# Patient Record
Sex: FEMALE | Race: WHITE | URBAN - METROPOLITAN AREA
[De-identification: names, ages, dates, MRNs, and addresses within clinical notes are randomized per-mention and may not be internally consistent; named-entity substitution may affect disease eponyms.]

---

## 2022-08-02 ENCOUNTER — OFFICE VISIT (OUTPATIENT)
Dept: INTERNAL MEDICINE CLINIC | Facility: CLINIC | Age: 52
End: 2022-08-02

## 2022-08-02 VITALS
WEIGHT: 186.81 LBS | HEART RATE: 99 BPM | BODY MASS INDEX: 35.27 KG/M2 | RESPIRATION RATE: 16 BRPM | DIASTOLIC BLOOD PRESSURE: 80 MMHG | SYSTOLIC BLOOD PRESSURE: 130 MMHG | OXYGEN SATURATION: 98 % | TEMPERATURE: 98 F | HEIGHT: 61.02 IN

## 2022-08-02 DIAGNOSIS — R10.9 CHRONIC ABDOMINAL PAIN: Primary | ICD-10-CM

## 2022-08-02 DIAGNOSIS — G89.29 CHRONIC ABDOMINAL PAIN: Primary | ICD-10-CM

## 2022-08-02 DIAGNOSIS — Z12.11 COLON CANCER SCREENING: ICD-10-CM

## 2022-08-02 PROCEDURE — 3008F BODY MASS INDEX DOCD: CPT | Performed by: INTERNAL MEDICINE

## 2022-08-02 PROCEDURE — 3075F SYST BP GE 130 - 139MM HG: CPT | Performed by: INTERNAL MEDICINE

## 2022-08-02 PROCEDURE — 3079F DIAST BP 80-89 MM HG: CPT | Performed by: INTERNAL MEDICINE

## 2022-08-02 PROCEDURE — 99203 OFFICE O/P NEW LOW 30 MIN: CPT | Performed by: INTERNAL MEDICINE

## 2022-08-02 RX ORDER — IBUPROFEN 800 MG/1
800 TABLET ORAL EVERY 8 HOURS PRN
COMMUNITY
Start: 2022-04-06

## 2022-08-05 DIAGNOSIS — R91.1 PULMONARY NODULE: Primary | ICD-10-CM

## 2022-08-09 ENCOUNTER — TELEPHONE (OUTPATIENT)
Dept: INTERNAL MEDICINE CLINIC | Facility: CLINIC | Age: 52
End: 2022-08-09

## 2022-08-09 NOTE — TELEPHONE ENCOUNTER
Pt dropped off lab test results from [address] [place] [address], results placed at Dr. Chloe Bautista folder to review.

## 2022-08-10 ENCOUNTER — TELEPHONE (OUTPATIENT)
Dept: INTERNAL MEDICINE CLINIC | Facility: CLINIC | Age: 52
End: 2022-08-10

## 2022-08-10 NOTE — TELEPHONE ENCOUNTER
Received labs from 8/8/22  Please let pt know:  TSH/free t4, T3 wnl   Lipid panel shows elevated triglycerides; recommend diet modification including reducing fats/carbohydrates/sugar intake and increasing exercise as tolerated. Not anemic and UA negative for infection. Renal/liver labs wnl. Results to be entered and sent to scan.

## 2022-08-12 ENCOUNTER — TELEPHONE (OUTPATIENT)
Dept: INTERNAL MEDICINE CLINIC | Facility: CLINIC | Age: 52
End: 2022-08-12

## 2022-08-12 DIAGNOSIS — R91.8 PULMONARY NODULES: Primary | ICD-10-CM

## 2022-08-12 DIAGNOSIS — Z87.891 HISTORY OF SMOKING: ICD-10-CM

## 2022-08-12 LAB
ALKALINE PHOSPHATASE: 68 U/L
ALT: 18 U/L
AST: 13 U/L
BILIRUBIN, TOTAL: 0.4 MG/DL
CALCIUM: 9.1 MG/DL
CHLORIDE: 105 MMOL/L
CHOLESTEROL, TOTAL: 176 MG/DL
CREATININE: 0.64 MG/DL (ref 0.6–1.2)
FREE T4 INDEX (T7): 2
HCT: 39.1 %
HDL CHOLESTEROL: 35 MG/DL
HGB: 13.3 G/DL
LDL CHOLESTEROL: 96 MG/DL (ref ?–130)
PLATELET COUNT: 217 THOUSAND/UL
POTASSIUM: 4.2 MMOL/L
SODIUM: 139 MMOL/L
T3 UPTAKE: 28 %
T4 (THYROXINE), TOTAL: 7.2 MCG/DL
TRIGLYCERIDES: 334 MG/DL
TSH: 2.21 MIU/L
WBC: 5.8 THOUSAND/UL

## 2022-09-28 ENCOUNTER — OFFICE VISIT (OUTPATIENT)
Dept: GASTROENTEROLOGY | Facility: CLINIC | Age: 52
End: 2022-09-28

## 2022-09-28 VITALS
SYSTOLIC BLOOD PRESSURE: 130 MMHG | DIASTOLIC BLOOD PRESSURE: 80 MMHG | BODY MASS INDEX: 33.99 KG/M2 | WEIGHT: 180 LBS | HEIGHT: 61 IN

## 2022-09-28 DIAGNOSIS — K21.9 GASTROESOPHAGEAL REFLUX DISEASE, UNSPECIFIED WHETHER ESOPHAGITIS PRESENT: Primary | ICD-10-CM

## 2022-09-28 DIAGNOSIS — Z12.11 COLON CANCER SCREENING: ICD-10-CM

## 2022-09-28 PROCEDURE — S0285 CNSLT BEFORE SCREEN COLONOSC: HCPCS | Performed by: STUDENT IN AN ORGANIZED HEALTH CARE EDUCATION/TRAINING PROGRAM

## 2022-09-28 RX ORDER — SODIUM, POTASSIUM,MAG SULFATES 17.5-3.13G
SOLUTION, RECONSTITUTED, ORAL ORAL
Qty: 1 EACH | Refills: 0 | Status: SHIPPED | OUTPATIENT
Start: 2022-09-28

## 2023-03-02 ENCOUNTER — OFFICE VISIT (OUTPATIENT)
Facility: CLINIC | Age: 53
End: 2023-03-02

## 2023-03-02 VITALS
HEART RATE: 104 BPM | SYSTOLIC BLOOD PRESSURE: 116 MMHG | BODY MASS INDEX: 35.05 KG/M2 | WEIGHT: 185.63 LBS | DIASTOLIC BLOOD PRESSURE: 78 MMHG | HEIGHT: 61 IN

## 2023-03-02 DIAGNOSIS — Z12.31 ENCOUNTER FOR MAMMOGRAM TO ESTABLISH BASELINE MAMMOGRAM: Primary | ICD-10-CM

## 2023-03-02 DIAGNOSIS — Z12.4 PAPANICOLAOU SMEAR FOR CERVICAL CANCER SCREENING: ICD-10-CM

## 2023-03-02 PROCEDURE — 99386 PREV VISIT NEW AGE 40-64: CPT | Performed by: OBSTETRICS & GYNECOLOGY

## 2023-03-02 RX ORDER — ESTRADIOL 0.1 MG/G
1 CREAM VAGINAL
Qty: 1 G | Refills: 5 | Status: SHIPPED | OUTPATIENT
Start: 2023-03-02

## 2023-03-20 LAB — HPV I/H RISK 1 DNA SPEC QL NAA+PROBE: NEGATIVE

## 2023-03-21 LAB — LAST PAP RESULT: NORMAL

## 2024-12-17 ENCOUNTER — OFFICE VISIT (OUTPATIENT)
Dept: INTERNAL MEDICINE CLINIC | Facility: CLINIC | Age: 54
End: 2024-12-17

## 2024-12-17 VITALS
WEIGHT: 173.19 LBS | HEART RATE: 96 BPM | SYSTOLIC BLOOD PRESSURE: 136 MMHG | OXYGEN SATURATION: 100 % | HEIGHT: 61.2 IN | TEMPERATURE: 98 F | BODY MASS INDEX: 32.7 KG/M2 | DIASTOLIC BLOOD PRESSURE: 78 MMHG

## 2024-12-17 DIAGNOSIS — Z12.11 COLON CANCER SCREENING: ICD-10-CM

## 2024-12-17 DIAGNOSIS — R42 VERTIGO: ICD-10-CM

## 2024-12-17 DIAGNOSIS — M54.9 BACK PAIN WITH SCIATICA: Primary | ICD-10-CM

## 2024-12-17 DIAGNOSIS — M54.30 BACK PAIN WITH SCIATICA: Primary | ICD-10-CM

## 2024-12-17 DIAGNOSIS — R91.8 PULMONARY NODULES: ICD-10-CM

## 2024-12-17 PROCEDURE — 99214 OFFICE O/P EST MOD 30 MIN: CPT | Performed by: INTERNAL MEDICINE

## 2024-12-17 RX ORDER — MECLIZINE HYDROCHLORIDE 25 MG/1
25 TABLET ORAL EVERY 8 HOURS PRN
Qty: 30 TABLET | Refills: 0 | Status: SHIPPED | OUTPATIENT
Start: 2024-12-17

## 2024-12-17 RX ORDER — METHYLPREDNISOLONE 4 MG/1
TABLET ORAL
Qty: 1 EACH | Refills: 0 | Status: SHIPPED | OUTPATIENT
Start: 2024-12-17

## 2024-12-17 RX ORDER — MELOXICAM 15 MG/1
15 TABLET ORAL DAILY PRN
Qty: 30 TABLET | Refills: 0 | Status: SHIPPED | OUTPATIENT
Start: 2024-12-17

## 2024-12-17 NOTE — PROGRESS NOTES
Subjective:   Phyllis Alexis is a 54 year old female  who presents for Pain (Lower Back )     Reports going to chiropractor regularly due to back pain.   States that lately its not helping her .  Now with RLE sciatica that is constant unless laying down. Ibuprofen helps but comes back  Denies saddle anesthesia/bowel or bladder incontinence/weakness     Pulmonary nodules noted in 8/2022. Pt was advised to follow-up with CT but has not followed up since.   CT ordered-provided [facility] order      Vertigo that lasted for a few days. Resolved now.   No acute cp/sob/angina   Hx of vertigo in remote past.     History/Other:    Chief Complaint Reviewed and Verified  Nursing Notes Reviewed and   Verified  Tobacco Reviewed  Allergies Reviewed  Medications Reviewed    Problem List Reviewed  Medical History Reviewed  Surgical History   Reviewed  Family History Reviewed  Social History Reviewed         Current Outpatient Medications   Medication Sig Dispense Refill    methylPREDNISolone (MEDROL) 4 MG Oral Tablet Therapy Pack As directed. 1 each 0    Meloxicam 15 MG Oral Tab Take 1 tablet (15 mg total) by mouth daily as needed for Pain. Do NOT take with other NSAIDs such as Aleve, Motrin, Ibuprofen or Advil 30 tablet 0    meclizine 25 MG Oral Tab Take 1 tablet (25 mg total) by mouth every 8 (eight) hours as needed for Dizziness. 30 tablet 0    ibuprofen 800 MG Oral Tab Take 1 tablet (800 mg total) by mouth every 8 (eight) hours as needed.         Review of Systems:  Pertinent items are noted in HPI.  A comprehensive 10 point review of systems was completed.  Pertinent positives and negatives noted in the the HPI.        Objective:   /78   Pulse 96   Temp 98.2 °F (36.8 °C)   Ht 5' 1.2\" (1.554 m)   Wt 173 lb 3.2 oz (78.6 kg)   SpO2 100%   BMI 32.51 kg/m²  Estimated body mass index is 32.51 kg/m² as calculated from the following:    Height as of this encounter: 5' 1.2\" (1.554 m).    Weight as of this encounter:  173 lb 3.2 oz (78.6 kg).  PHYSICAL EXAM:   General: no acute distress   Respiratory: no increased work of breathing; good air exchange; CTAB; no crackles or wheezing   Cardiovascular: RRR; S1, S2; no murmurs  Neurological: awake, alert, oriented x3; CNII-XII grossly intact;  MSK: RLE radiculopathy strength 5/5  Behavioral/Psych: euthymic; appropriate affect       Assessment & Plan:   Phyllis was seen today for pain.    Diagnoses and all orders for this visit:    Back pain with sciatica  -     methylPREDNISolone (MEDROL) 4 MG Oral Tablet Therapy Pack; As directed.  -     z [facility] XR LUMBAR SPINE COMPLETE W/FLEX + EXT (CPT=72114); Future  -     Physical Therapy Referral - [facility]  -     Meloxicam 15 MG Oral Tab; Take 1 tablet (15 mg total) by mouth daily as needed for Pain. Do NOT take with other NSAIDs such as Aleve, Motrin, Ibuprofen or Advil    Pulmonary nodules  -     z [facility] CT CHEST (CPT=71250);     Colon cancer screening  -     INSURE FECAL GLOBIN by IMMUNOASSAY [[id number]] [Q]    Vertigo  -     meclizine 25 MG Oral Tab; Take 1 tablet (25 mg total) by mouth every 8 (eight) hours as needed for Dizziness.      Also discussed free mammogram services that are provided in the state              Reny Lind MD

## 2025-01-06 ENCOUNTER — TELEPHONE (OUTPATIENT)
Dept: INTERNAL MEDICINE CLINIC | Facility: CLINIC | Age: 55
End: 2025-01-06

## 2025-01-06 NOTE — TELEPHONE ENCOUNTER
DVF: while otp with pt going over lab results, pt asked if she could take 800 mg ibuprofen rather than Meloxicam. She feels it would be easier on her stomach.   Pt understands she can't take them together.     Are you willing to send in Rx for 800 mg ibuprofen, or should pt just stick with Meloxicam? If so, please send to [facility]    **pt OK with MCM response**

## 2025-01-06 NOTE — TELEPHONE ENCOUNTER
New GI symptoms of diarrhea/cramping not side effects of medication. Likely infectious. If symptoms not improving then should make appt

## 2025-01-06 NOTE — TELEPHONE ENCOUNTER
Dr. Oliver PICKETT.   Patient reports feeling cramping abdominal pain and has diarrhea. Patient will only take tylenol for pain. Patient will go to PT for back pain. This RN sent [employer] message with PT contact information.

## 2025-01-06 NOTE — TELEPHONE ENCOUNTER
Ibuprofen might actually be worse than meloxicam regarding stomach symptoms.   Is she having abdominal pain? If she is then should stop and only do Tylenol for pain.   Should also try PT for back pain.

## 2025-01-07 ENCOUNTER — TELEMEDICINE (OUTPATIENT)
Dept: INTERNAL MEDICINE CLINIC | Facility: CLINIC | Age: 55
End: 2025-01-07

## 2025-01-07 DIAGNOSIS — K52.9 GASTROENTERITIS: Primary | ICD-10-CM

## 2025-01-07 DIAGNOSIS — R51.9 NONINTRACTABLE EPISODIC HEADACHE, UNSPECIFIED HEADACHE TYPE: ICD-10-CM

## 2025-01-07 PROCEDURE — 98004 SYNCH AUDIO-VIDEO EST SF 10: CPT | Performed by: INTERNAL MEDICINE

## 2025-01-07 NOTE — TELEPHONE ENCOUNTER
Action Requested: Summary for Provider     []  Critical Lab, Recommendations Needed  [x] Need Additional Advice  []   FYI    []   Need Orders  [] Need Medications Sent to Pharmacy  []  Other     SUMMARY:  Spoke to patient who reports having diarrhea, severe headache, low grade fever 99.0 F, strong urine odor, and abdominal cramping. Patient states these symptoms began on 1/6 and claims her  and son previously had similar symptoms and believes it could be the norovirus. Patient denies shortness of breath, chest pain, urination pain, nausea, vomiting, and weakness. Patient has not taken any over the counter medication. Patient describes having a throbbing headache in the front and back of her head. Patient states she doesn't have much appetite, but she is staying hydrated. Patient is wanting PCP recommendations.    This RN recommended that the patient stay hydrated, get plenty of rest, and eat bland, easily digestible foods. Advised patient is she has worsening symptoms she should go to an immediate care. Patient verbalized understanding.    Dr. Heath- Any recommendations? Please advise. TY     Reason for call: Sick Call (Stomach flu, Diarrhea, Headache, Urination odor )  Onset: Jan 6, 2025                    *Ok to send patient [employer] message with PCP recommendations.

## 2025-01-07 NOTE — PROGRESS NOTES
Subjective:   Phyllis Alexis is a 54 year old female who presents for the following:       Reports 2 days of diarrhea/HA and fevers. Today no longer has fevers and diarrhea has significantly slowed down. No BM today yet.   Denies rectal bleeding.   Fever has resolved.   Has had sick family members.   Denies nausea/vomiting   Able to hydrate.     History/Other:    No Further Nursing Notes to Review         Current Outpatient Medications   Medication Sig Dispense Refill    methylPREDNISolone (MEDROL) 4 MG Oral Tablet Therapy Pack As directed. 1 each 0    Meloxicam 15 MG Oral Tab Take 1 tablet (15 mg total) by mouth daily as needed for Pain. Do NOT take with other NSAIDs such as Aleve, Motrin, Ibuprofen or Advil 30 tablet 0    meclizine 25 MG Oral Tab Take 1 tablet (25 mg total) by mouth every 8 (eight) hours as needed for Dizziness. 30 tablet 0       Review of Systems:  Pertinent items are noted in HPI.  A comprehensive 10 point review of systems was completed.  Pertinent positives and negatives noted in the the HPI.        Objective:   There were no vitals taken for this visit. Estimated body mass index is 32.51 kg/m² as calculated from the following:    Height as of 12/17/24: 5' 1.2\" (1.554 m).    Weight as of 12/17/24: 173 lb 3.2 oz (78.6 kg).  PHYSICAL EXAM:   General:  no acute distress   Respiratory: no increased work of breathing;   Neurological: awake, alert, oriented x3; CNII-XII grossly intact;  Behavioral/Psych: euthymic; appropriate affect   Skin: no facial rashes     Assessment & Plan:   Diagnoses and all orders for this visit:    Gastroenteritis  Nonintractable episodic headache, unspecified headache type  -as above. Likely viral vs other   -improving.   -discussed Pedialyte/hydration and bland diet   -supportive care   -close monitoring   -covid test               This visit is conducted using Telemedicine with live, interactive video and audio.    Patient has been referred to the [facility] website at  [email or web address] to review the yearly Consent to Treat document.    Patient understands and accepts financial responsibility for any deductible, co-insurance and/or co-pays associated with this service.    Reny Lind MD

## 2025-01-13 DIAGNOSIS — M54.30 BACK PAIN WITH SCIATICA: ICD-10-CM

## 2025-01-13 DIAGNOSIS — M54.9 BACK PAIN WITH SCIATICA: ICD-10-CM

## 2025-01-13 RX ORDER — MELOXICAM 15 MG/1
15 TABLET ORAL DAILY PRN
Qty: 30 TABLET | Refills: 0 | Status: SHIPPED | OUTPATIENT
Start: 2025-01-13

## 2025-01-13 NOTE — TELEPHONE ENCOUNTER
Requesting    Name from pharmacy: MELOXICAM 15 MG TABLET         Will file in chart as: MELOXICAM 15 MG Oral Tab    Sig: Take 1 tablet (15 mg total) by mouth daily as needed for Pain. Do NOT take with other NSAIDs such as Aleve, Motrin, Ibuprofen or Advil    Disp: 30 tablet    Refills: 0 (Pharmacy requested: Not specified)    Start: 1/13/2025    Class: Normal    Non-formulary For: Back pain with sciatica    Last ordered: 3 weeks ago (12/17/2024) by Reny Lind MD    Last refill: 12/17/2024    Rx #: [id number]    Non-Narcotic Pain Medication Protocol Passed 01/13/2025 12:25 AM   Protocol Details In person appointment or virtual visit in the past 6 mos or appointment in next 3 mos    Medication is active on med list      To be filled at: [facility] [address] [address] [phone], [phone]     LOV: 01/07/25 w/ DVF   RTC: No Follow Up on File    Last Relevant Labs: No Recent Labs on File     Future Appointments   Date Time Provider Department Center   2/5/2025  2:00 PM Reny Velasquez MD [facility]

## 2025-01-15 ENCOUNTER — OFFICE VISIT (OUTPATIENT)
Dept: INTERNAL MEDICINE CLINIC | Facility: CLINIC | Age: 55
End: 2025-01-15

## 2025-01-15 VITALS
RESPIRATION RATE: 16 BRPM | BODY MASS INDEX: 32.47 KG/M2 | DIASTOLIC BLOOD PRESSURE: 72 MMHG | HEIGHT: 61.2 IN | WEIGHT: 172 LBS | HEART RATE: 58 BPM | TEMPERATURE: 98 F | SYSTOLIC BLOOD PRESSURE: 120 MMHG | OXYGEN SATURATION: 98 %

## 2025-01-15 DIAGNOSIS — R30.0 DYSURIA: Primary | ICD-10-CM

## 2025-01-15 LAB
APPEARANCE: CLEAR
BILIRUBIN: NEGATIVE
GLUCOSE (URINE DIPSTICK): NEGATIVE MG/DL
KETONES (URINE DIPSTICK): NEGATIVE MG/DL
MULTISTIX EXPIRATION DATE: 2025 DATE
MULTISTIX LOT#: 4684 NUMERIC
NITRITE, URINE: NEGATIVE
PH, URINE: 6 (ref 4.5–8)
PROTEIN (URINE DIPSTICK): NEGATIVE MG/DL
SPECIFIC GRAVITY: 1.02 (ref 1–1.03)
URINE-COLOR: YELLOW
UROBILINOGEN,SEMI-QN: 0.2 MG/DL (ref 0–1.9)

## 2025-01-15 PROCEDURE — 81003 URINALYSIS AUTO W/O SCOPE: CPT | Performed by: INTERNAL MEDICINE

## 2025-01-15 PROCEDURE — 87086 URINE CULTURE/COLONY COUNT: CPT | Performed by: INTERNAL MEDICINE

## 2025-01-15 PROCEDURE — 99213 OFFICE O/P EST LOW 20 MIN: CPT | Performed by: INTERNAL MEDICINE

## 2025-01-15 NOTE — PATIENT INSTRUCTIONS
We will contact you with your final urine test results.  Let me know sooner if your symptoms return

## 2025-01-15 NOTE — PROGRESS NOTES
Patient Office Visit    ASSESSMENT AND PLAN:   1. Dysuria  Note: Will await final urine culture results before starting antibiotics that she is doing better  - Urine Dip, auto without Micro  - Urine Culture, Routine [E]      Return to clinic as needed for above    Patient/Caregiver Education: Patient/Caregiver Education: There are no barriers to learning. Medical education done. Outcome: Patient verbalizes understanding. Patient is notified to call with any questions, complications, allergies, or worsening or changing symptoms.  Patient is to call with any side effects or complications from the treatments as a result of today.      Reviewed Past Medical History and   There is no problem list on file for this patient.      Orders Placed This Encounter   Procedures    Urine Dip, auto without Micro     Order Specific Question:   Release to patient     Answer:   Immediate     Requested Prescriptions      No prescriptions requested or ordered in this encounter         Melia Theodore DO  CC:  Chief Complaint   Patient presents with    UTI         HPI:   Phyllis Alexis is a 54-year-old female who presents for dysuria    Dysuria: Has been going on for the past few days.  It started after she had some diarrhea and was not feeling well.  She started to have back pain and burning with urination and urinary frequency.  Yesterday she has noticed that her symptoms were better and today she is barely having any symptoms.  She still just wanted to make sure she does not have a UTI.  Last UTI was almost 20 years ago.    Past Medical History:    Anxiety    Hyperlipidemia       Past Surgical History:   Procedure Laterality Date    Appendectomy      Appendectomy            Tubal ligation         Social History:  Social History     Socioeconomic History    Marital status:    Tobacco Use    Smoking status: Former     Current packs/day: 0.00     Types: Cigarettes     Quit date: 2020     Years since quitting:  4.1    Smokeless tobacco: Former    Tobacco comments:     I used to smoke one or two cigarettes a day but I used to quit for long periods of time   Vaping Use    Vaping status: Never Used   Substance and Sexual Activity    Alcohol use: Yes     Alcohol/week: 1.0 standard drink of alcohol     Types: 1 Glasses of wine per week     Comment: not once a week more like once a month    Drug use: Never    Sexual activity: Not Currently   Other Topics Concern    Caffeine Concern No    Exercise No    Seat Belt Yes     Comment: I use it all the time    Special Diet No    Stress Concern Yes     Comment: I have a son with autism and that causes a lot of stress    Weight Concern Yes     Comment: yes its difficult to lose weight more after menopause     Family History:  Family History   Problem Relation Age of Onset    Other (Other) Father 81        Covid Complications    Diabetes Father     Diabetes Maternal Grandmother     Asthma Brother     Diabetes Brother     Psychiatric Son         Autism     Allergies:  Allergies[1]  Current Meds:  Medications Ordered Prior to Encounter[2]      REVIEW OF SYSTEMS   Constitutional: no fatigue normal energy no weight changes   HENT: normal sinuses and no mouth issues   Eyes: . normal vision no eye pain   Respiratory: normal respirations no cough   Cardiovascular: no CP, or palpitations   Gastrointestinal: normal bowels and no abd pains   Genitourinary: As above  Musculoskeletal: no pains in arms/legs, normal range of motion   Skin: no rashes or skin lesions that are new   Neurological:  no weakness, no numbness, normal gait   Hematological:  no bruises or bleeding   Psychiatric/Behavioral: normal mood no anxiety normal behavior     /72 (BP Location: Left arm, Patient Position: Sitting, Cuff Size: adult)   Pulse 58   Temp 98 °F (36.7 °C) (Temporal)   Resp 16   Ht 5' 1.2\" (1.554 m)   Wt 172 lb (78 kg)   SpO2 98%   BMI 32.29 kg/m²     PHYSICAL EXAM:   Constitutional: Vital signs  reviewed as noted, well developed, in no acute distress.   HENT: NCAT  Eyes: pupils reactive bilaterally  Cardiovascular: nl s1 s2 no m/r/g  Pulmonary/Chest: CTA bilaterally with no wheezes  Abdominal: Soft NT normal Bowel sounds, no CVA tenderness noted  Extremities: no pedal edema   Neurological:  no weakness in UE and LE, reflexes are normal  Skin: no rashes or bruises on visualized skin, not undressed   Psychiatric:normal mood              [1]   Allergies  Allergen Reactions    Dust SHORTNESS OF BREATH, OTHER (SEE COMMENTS) and Coughing    Dust Mite Extract UNKNOWN     Fall allergies   [2]   Current Outpatient Medications on File Prior to Visit   Medication Sig Dispense Refill    MELOXICAM 15 MG Oral Tab TAKE 1 TABLET (15 MG TOTAL) BY MOUTH DAILY AS NEEDED FOR PAIN. DO NOT TAKE WITH OTHER NSAIDS SUCH AS ALEVE, MOTRIN, IBUPROFEN OR ADVIL 30 tablet 0    methylPREDNISolone (MEDROL) 4 MG Oral Tablet Therapy Pack As directed. (Patient not taking: Reported on 1/15/2025) 1 each 0    meclizine 25 MG Oral Tab Take 1 tablet (25 mg total) by mouth every 8 (eight) hours as needed for Dizziness. 30 tablet 0     No current facility-administered medications on file prior to visit.

## 2025-02-04 ENCOUNTER — SPINE CENTER NAVIGATION (OUTPATIENT)
Age: 55
End: 2025-02-04

## 2025-02-04 ENCOUNTER — TELEPHONE (OUTPATIENT)
Dept: INTERNAL MEDICINE CLINIC | Facility: CLINIC | Age: 55
End: 2025-02-04

## 2025-02-04 DIAGNOSIS — M54.40 LOW BACK PAIN WITH SCIATICA, SCIATICA LATERALITY UNSPECIFIED, UNSPECIFIED BACK PAIN LATERALITY, UNSPECIFIED CHRONICITY: Primary | ICD-10-CM

## 2025-02-04 NOTE — TELEPHONE ENCOUNTER
We received a form for Dr. Ivana Lind to sign for PT Update placed in DVF's inbasket for review and signature.

## 2025-02-04 NOTE — PROGRESS NOTES
Spine Center Referral Navigation Encounter Note    Referred by: Reny Lind MD     Imaging: XR Lumbar Spine, MRI Lumbar Spine ordered   If imaging done at an external facility, instructed patient to bring disc of MRI to appointment.     Previously Seen Spine Care Providers: None    Referred to: Ana Cristina Hector PA-C in Neurosurgery     Information below is patient reported.     [facility] [employer]       2/4- Spoke to patient. She would like to have her MRI done prior to being seen. Advised her to schedule her MRI with the scheduling department and then contact me and we can schedule her an appointment after she would have that imaging done.    2/12- Spoke to patient and was able to get her scheduled for next Tuesday 2/18 per her request.

## 2025-02-04 NOTE — TELEPHONE ENCOUNTER
Signed. Placed in out bin.  Please also let her know that since no improvement with PT then should see spine specialist and do MRI ([facility] order placed). Orders placed.

## 2025-02-05 NOTE — TELEPHONE ENCOUNTER
Outgoing Fax  After faxing over multiple time yesterday 02/04/25 called to verify fax # was told no alternative fax # I attempted another fax machine and it went through  Pages 2/2 Status CP  Original sent to scan copy placed in accordion POD1

## 2025-02-10 DIAGNOSIS — M54.30 BACK PAIN WITH SCIATICA: ICD-10-CM

## 2025-02-10 DIAGNOSIS — M54.9 BACK PAIN WITH SCIATICA: ICD-10-CM

## 2025-02-10 NOTE — TELEPHONE ENCOUNTER
Requesting    Name from pharmacy: MELOXICAM 15 MG TABLET         Will file in chart as: MELOXICAM 15 MG Oral Tab    Sig: TAKE 1 TABLET (15 MG TOTAL) BY MOUTH DAILY AS NEEDED FOR PAIN. DO NOT TAKE WITH OTHER NSAIDS SUCH AS ALEVE, MOTRIN, IBUPROFEN OR ADVIL    Disp: 30 tablet    Refills: 0 (Pharmacy requested: Not specified)    Start: 2/10/2025    Class: Normal    Non-formulary For: Back pain with sciatica    Last ordered: 4 weeks ago (1/13/2025) by Reny Lind MD    Last refill: 1/13/2025    Rx #: [id number]    Non-Narcotic Pain Medication Protocol Passed 02/10/2025 12:17 AM   Protocol Details In person appointment or virtual visit in the past 6 mos or appointment in next 3 mos    Medication is active on med list      To be filled at: [facility] [address] [place] [address] [phone], [phone]     LOV: 01/15/25 w/ RHODA   RTC: No Follow Up on File   Last Relevant Labs: 08/12/22  No future appointments.

## 2025-02-11 RX ORDER — MELOXICAM 15 MG/1
15 TABLET ORAL DAILY PRN
Qty: 30 TABLET | Refills: 0 | Status: SHIPPED | OUTPATIENT
Start: 2025-02-11

## 2025-02-18 ENCOUNTER — OFFICE VISIT (OUTPATIENT)
Dept: SURGERY | Facility: CLINIC | Age: 55
End: 2025-02-18

## 2025-02-18 VITALS
HEIGHT: 62 IN | DIASTOLIC BLOOD PRESSURE: 74 MMHG | WEIGHT: 173 LBS | SYSTOLIC BLOOD PRESSURE: 110 MMHG | BODY MASS INDEX: 31.83 KG/M2 | HEART RATE: 101 BPM | OXYGEN SATURATION: 97 %

## 2025-02-18 DIAGNOSIS — M51.26 LUMBAR DISC HERNIATION: ICD-10-CM

## 2025-02-18 DIAGNOSIS — M53.3 PAIN OF RIGHT SACROILIAC JOINT: ICD-10-CM

## 2025-02-18 DIAGNOSIS — R29.898 RIGHT LEG WEAKNESS: ICD-10-CM

## 2025-02-18 DIAGNOSIS — M51.362 DEGENERATION OF INTERVERTEBRAL DISC OF LUMBAR REGION WITH DISCOGENIC BACK PAIN AND LOWER EXTREMITY PAIN: ICD-10-CM

## 2025-02-18 DIAGNOSIS — M54.16 RIGHT LUMBAR RADICULOPATHY: Primary | ICD-10-CM

## 2025-02-18 PROCEDURE — 99205 OFFICE O/P NEW HI 60 MIN: CPT | Performed by: PHYSICIAN ASSISTANT

## 2025-02-18 NOTE — H&P
Patient: Phyllis Alexis  Medical Record Number: [id number]  YOB: 1970  PCP: Reny Lind MD    Reason for visit: Low back pain, right leg pain    Thank you very much for requesting this consultation. I had the opportunity to evaluate and initiate care for your patient today, as per your request.    HISTORY OF CHIEF COMPLAINT:    Phyllis Alexis is a very pleasant 54 year old female with no pertinent PMH listed in EPIC  Presents today for a complaint of: Low back pain, right leg pain  Onset: Symptoms began years ago. About 20 years. Had RLE pain for the past year.   Location: Right low back and radiates down the the back of the right leg to the ankle, not in the foot. No LLE complaints.   Duration/Timing: Symptoms have been occurring for .  Pain is constant/intermittent.  Character: Tingling and weakness.   Rate: Patient rates the pain  9/10.   Severity: Aggravated with sitting and driving. Improved with rest and laying down.   Has a vaginal heaviness sensation. Sensation is present with wiping. Has been constipated. Has the urge and sensation to urinate and have bowel movements. No bladder/bowel incontinence/retention.   Treatment: Chiropractic care, no relief. Was in PT, as no progress recommended to hold and see the physician. No relief with medrol dose back. Meloxicam provides mild relief.   Mild neck pain, which the chiropractor has been treating. Well controlled. Has some mild pain in her triceps but no pain that radiates down the arms. No hand weakness, numbness, tingling.     Past Medical History:    Anxiety    Hyperlipidemia      Past Surgical History:   Procedure Laterality Date    Appendectomy      Appendectomy            Tubal ligation        Family History   Problem Relation Age of Onset    Other (Other) Father 81        Covid Complications    Diabetes Father     Diabetes Maternal Grandmother     Asthma Brother     Diabetes Brother     Psychiatric Son         Autism       Social History     Socioeconomic History    Marital status:    Tobacco Use    Smoking status: Former     Current packs/day: 0.00     Types: Cigarettes     Quit date: 2020     Years since quittin.2    Smokeless tobacco: Former    Tobacco comments:     I used to smoke one or two cigarettes a day but I used to quit for long periods of time   Vaping Use    Vaping status: Never Used   Substance and Sexual Activity    Alcohol use: Yes     Alcohol/week: 1.0 standard drink of alcohol     Types: 1 Glasses of wine per week     Comment: not once a week more like once a month    Drug use: Never    Sexual activity: Not Currently   Other Topics Concern    Caffeine Concern No    Exercise No    Seat Belt Yes     Comment: I use it all the time    Special Diet No    Stress Concern Yes     Comment: I have a son with autism and that causes a lot of stress    Weight Concern Yes     Comment: yes its difficult to lose weight more after menopause      Allergies[1]   Current Medications:  Current Outpatient Medications   Medication Sig Dispense Refill    MELOXICAM 15 MG Oral Tab TAKE 1 TABLET (15 MG TOTAL) BY MOUTH DAILY AS NEEDED FOR PAIN. DO NOT TAKE WITH OTHER NSAIDS SUCH AS ALEVE, MOTRIN, IBUPROFEN OR ADVIL 30 tablet 0    methylPREDNISolone (MEDROL) 4 MG Oral Tablet Therapy Pack As directed. (Patient not taking: Reported on 1/15/2025) 1 each 0    meclizine 25 MG Oral Tab Take 1 tablet (25 mg total) by mouth every 8 (eight) hours as needed for Dizziness. 30 tablet 0        REVIEW OF SYSTEMS   Comprehensive review of systems done. Negative except what is outlined in the above HPI.     PHYSICAL EXAMIMATION    vitals were not taken for this visit.   GENERAL: Very pleasant patient is in no apparent distress. Sitting comfortably in the examination chair.   HEENT: Normocephalic, atraumatic.  RESPIRATORY RATE: Easy and Even  SKIN: Warm and dry  NEURO: Awake, alert and orientated. Speech fluent, comprehension intact, answering  questions appropriately.     SPINE:  Gait/Coordination: Gait deferred  Palpation: Non tender to palpation of midline lumbar spine or bilateral facets. + right SI joint tenderness    Upper Extremity Strength:    Deltoid  Biceps  Triceps     Intrinsics      Right 5 5 5 5 5     Left 5 5 5 5 5   Lower Extremity Strength:     Iliopsoas  Hamstrings   Quads    D-flexion P-flexion Great Toe   Right       5         5       5         5 5 5   Left       5         5       5         5 5 5     Tests:   Test Right   (POS or NEG) Left   (POS or NEG)   Martinez's Sign Neg Neg   Clonus Neg Neg     DATA:   None    IMAGING:   Result Narrative      Exam: MRI LUMBAR SP W/O CONTRAST  CPT Code(s): 18548 - MRI LUMBAR SPINE W/O DYE    MRI LUMBAR SPINE WITHOUT CONTRAST    HISTORY: Lumbago with sciatica, unspecified side    COMPARISON: X-rays of the lumbar spine dated 1/5/2025.    TECHNIQUE: Routine MRI exam performed on a wide bore ultra high field 3.0 T Siemens Skyra MR scanner, without intravenous contrast.    FINDINGS: Vertebral body heights and alignment maintained. Multilevel disc degeneration with associated discogenic reactive marrow change. The tip of the conus medullaris is at the L1 level.  Visualized spinal cord and cauda equina are normal in  appearance.    L1-2: Minimal disc desiccation No significant posterior disc bulge or focal disc herniation.  Unremarkable facet joints.  No central canal stenosis or neural foraminal narrowing.    L2-3: Minimal disc desiccation.  No significant posterior disc bulge or focal disc herniation.  Unremarkable facet joints.  No central canal stenosis or neural foraminal narrowing.    L3-4: Disc degeneration characterized by disc desiccation and a shallow posterior disc bulge. No focal disc herniation. Mild facet arthropathy. No central canal stenosis. Mild bilateral neural foraminal narrowing.    L4-5: Disc degeneration characterized by disc desiccation and a broad posterior disc bulge. No focal  disc herniation. Moderate facet arthropathy. Mild central canal stenosis. Mild to moderate bilateral neural foraminal narrowing.    L5-S1: Disc degeneration characterized by disc desiccation and a broad posterior disc bulge. Superimposed right subarticular protrusion measuring 8 mm AP dimension. Effacement of the right subarticular recess with encroachment on the right S1 nerve root.   Encroachment on the thecal sac without significant central canal stenosis. Mild to moderate left and no right neural foraminal narrowing.    IMPRESSION:  1.L5-S1: Posterior disc bulge with a superimposed right subarticular protrusion measuring 8 mm AP dimension. Effacement of the right subarticular recess with encroachment on the right S1 nerve root. Encroachment on the thecal sac without significant  central canal stenosis. Mild to moderate left and no right neural foraminal narrowing.  2.L4-5: Mild central canal stenosis. Mild to moderate bilateral neural foraminal narrowing.  3.L3-4: No central canal stenosis. Mild bilateral neural foraminal narrowing.    Interpreting Radiologist:    Jonas Chavira M.D.  Electronically Signed: 02/12/2025 09:13 AM  Result Narrative      Exam: XRAY LUMBAR SPINE 6 PLUS VIEWS  CPT Code(s): 07640 - X_RAY EXAM L_S SPINE BENDING    INDICATION: Back pain with sciatica. Dorsalgia, unspecified. Right-sided low back pain radiating down into the right buttock.    COMPARISON:  None.    TECHNIQUE:  7 radiographs of the lumbar spine including bilateral oblique radiographs as well as lateral radiographs in neutral position, flexion, and extension.    FINDINGS:  There are 5 nonrib-bearing lumbar-type vertebral bodies. Lumbar alignment is normal. No malalignment is appreciated with flexion/extension. There is normal bone mineralization. Lumbar vertebral heights and pedicles are intact with no vertebral   compression fractures. Mild lumbar spondylosis is identified with multilevel small marginal osteophytes as well  as mild disc space narrowing at L5-S1. Mild to moderate facet arthrosis is present within the lower lumbar spine. No lytic or sclerotic bone  lesions are identified.    Numerous faceted gallstones are identified within the gallbladder in the right mid abdomen, measuring up to 1.7 cm.    IMPRESSION:  1.Normal lumbar alignment with intact lumbar vertebral heights. Mild diffuse lumbar spondylosis. Mild to moderate lower lumbar spine facet arthrosis.  2.Partially visualized cholelithiasis.    Interpreting Radiologist:    Shashank Cutler M.D.  Electronically Signed: 01/02/2025 11:51 AM  MEDICAL DECISION MAKING:     ASSESSMENT and PLAN:    ICD-10-CM    1. Right lumbar radiculopathy  M54.16 Pain Management Referral - In Network      2. Lumbar disc herniation  M51.26 Pain Management Referral - In Network      3. Degeneration of intervertebral disc of lumbar region with discogenic back pain and lower extremity pain  M51.362 Pain Management Referral - In Network      4. Right leg weakness  R29.898 Pain Management Referral - In Network      5. Pain of right sacroiliac joint  M53.3 Pain Management Referral - In Network        PLAN:   1. Medication: None prescribed  2. Imaging:    - Reviewed today:    - MRI lumbar spine:     - Lumbar DDD most prominent at L4-S1. Multi level facet arthropahty noted. Disc bulge at L5-S1 bilaterally but more prominent on the right with impingement of the right descending S1 nerve root. Smaller disc bulge left likely abutting the left S1 nerve root.   - Ordered today:    -None  3. Activity:    -Encourage core/spine strengthening   -Encourage maintaining a healthy weight   -Encouraged remaining active   -Encourage good posture   -Encourage proper lifting technique  4. Referral:    -Pain services:     -Further assessment for injections.  Patient may benefit from a right L5-S1 transforaminal epidural steroid injection given her S1 radiculopathy as well as a possible right sacroiliac joint injection.   Will defer to pain services recommendations  5. Follow up in 2-3 months with Dr. Young or call or follow up sooner or go to the ED for any new, worsening or concerning signs or symptoms     I reviewed imaging. I discussed the plan and reviewed imaging with the patient. The patient agrees with the plan, verbalized understanding and is appreciative. All questions were sought out and thoroughly answered to satisfaction.       Total visit time: 60 min  More than 50% spent coordinating care, providing patient education, reviewing imaging, discussing activity, discussing pain services and counseling.    Thank you very much for the kind referral.  Respectfully yours,    Ana Cristina Hector M.S., GENNA  [address], [place] [address]  [phone]  2/18/2025 10:07 AM    Dragon speech recognition software was used to prepare this note. If a word or phrase is confusing, it is likely due to a failure of recognition. Please contact me with any questions or clarifications.         [1]   Allergies  Allergen Reactions    Dust SHORTNESS OF BREATH, OTHER (SEE COMMENTS) and Coughing    Dust Mite Extract UNKNOWN     Fall allergies

## 2025-02-18 NOTE — PROGRESS NOTES
New patient:  Reason for visit:   R low back pain radiating to R leg   Pt reports back pain for \"years\", pt feels symptoms have worsened in the past 3 years     Numeric Rating Scale:        Pain at Present: 9/10                                                                                                              Past Treatments for Current Pain Condition:     Chiropractor   PT  Pt taking meloxicam- some relief   Completed MDP with no relief      Prior diagnostic testing related to this condition:    MRI spine lumbar DOS 02/12/25  XR lumbar spine DOS 01/02/25

## 2025-02-18 NOTE — PATIENT INSTRUCTIONS
Refill policies:    Allow 2-3 business days for refills; controlled substances may take longer.  Contact your pharmacy at least 5 days prior to running out of medication and have them send an electronic request or submit request through the “request refill” option in your [employer] account.  Refills are not addressed on weekends; covering physicians do not authorize routine medications on weekends.  No narcotics or controlled substances are refilled after noon on Fridays or by on call physicians.  By law, narcotics must be electronically prescribed.  A 30 day supply with no refills is the maximum allowed.  If your prescription is due for a refill, you may be due for a follow up appointment.  To best provide you care, patients receiving routine medications need to be seen at least once a year.  Patients receiving narcotic/controlled substance medications need to be seen at least once every 3 months.  In the event that your preferred pharmacy does not have the requested medication in stock (e.g. Backordered), it is your responsibility to find another pharmacy that has the requested medication available.  We will gladly send a new prescription to that pharmacy at your request.    Scheduling Tests:    If your physician has ordered radiology tests such as MRI or CT scans, please contact Central Scheduling at [phone] right away to schedule the test.  Once scheduled, the [facility] Centralized Referral Team will work with your insurance carrier to obtain pre-certification or prior authorization.  Depending on your insurance carrier, approval may take 3-10 days.  It is highly recommended patients assure they have received an authorization before having a test performed.  If test is done without insurance authorization, patient may be responsible for the entire amount billed.      Precertification and Prior Authorizations:  If your physician has recommended that you have a procedure or additional testing performed the [facility]  Centralized Referral Team will contact your insurance carrier to obtain pre-certification or prior authorization.    You are strongly encouraged to contact your insurance carrier to verify that your procedure/test has been approved and is a COVERED benefit.  Although the [facility] Centralized Referral Team does its due diligence, the insurance carrier gives the disclaimer that \"Although the procedure is authorized, this does not guarantee payment.\"    Ultimately the patient is responsible for payment.   Thank you for your understanding in this matter.  Paperwork Completion:  If you require FMLA or disability paperwork for your recovery, please make sure to either drop it off or have it faxed to our office at [phone]. Be sure the form has your name and date of birth on it.  The form will be faxed to our Forms Department and they will complete it for you.  There is a 25$ fee for all forms that need to be filled out.  Please be aware there is a 10-14 day turnaround time.  You will need to sign a release of information ([place]) form if your paperwork does not come with one.  You may call the Forms Department with any questions at [phone].  Their fax number is [phone].     Standing core/spine strengthening exercises

## 2025-02-20 ENCOUNTER — OFFICE VISIT (OUTPATIENT)
Dept: PAIN CLINIC | Facility: CLINIC | Age: 55
End: 2025-02-20

## 2025-02-20 VITALS
SYSTOLIC BLOOD PRESSURE: 124 MMHG | BODY MASS INDEX: 32 KG/M2 | WEIGHT: 173 LBS | DIASTOLIC BLOOD PRESSURE: 78 MMHG | HEART RATE: 94 BPM | OXYGEN SATURATION: 98 %

## 2025-02-20 DIAGNOSIS — M54.16 RIGHT LUMBAR RADICULOPATHY: ICD-10-CM

## 2025-02-20 DIAGNOSIS — M51.26 HNP (HERNIATED NUCLEUS PULPOSUS), LUMBAR: Primary | ICD-10-CM

## 2025-02-20 PROCEDURE — 99214 OFFICE O/P EST MOD 30 MIN: CPT | Performed by: PHYSICIAN ASSISTANT

## 2025-02-20 NOTE — PATIENT INSTRUCTIONS
Refill policies:    Allow 2-3 business days for refills; controlled substances may take longer.  Contact your pharmacy at least 5 days prior to running out of medication and have them send an electronic request or submit request through the “request refill” option in your [employer] account.  Refills are not addressed on weekends; covering physicians do not authorize routine medications on weekends.  No narcotics or controlled substances are refilled after noon on Fridays or by on call physicians.  By law, narcotics must be electronically prescribed.  A 30 day supply with no refills is the maximum allowed.  If your prescription is due for a refill, you may be due for a follow up appointment.  To best provide you care, patients receiving routine medications need to be seen at least once a year.  Patients receiving narcotic/controlled substance medications need to be seen at least once every 3 months.  In the event that your preferred pharmacy does not have the requested medication in stock (e.g. Backordered), it is your responsibility to find another pharmacy that has the requested medication available.  We will gladly send a new prescription to that pharmacy at your request.    Scheduling Tests:    If your physician has ordered radiology tests such as MRI or CT scans, please contact Central Scheduling at [phone] right away to schedule the test.  Once scheduled, the [facility] Centralized Referral Team will work with your insurance carrier to obtain pre-certification or prior authorization.  Depending on your insurance carrier, approval may take 3-10 days.  It is highly recommended patients assure they have received an authorization before having a test performed.  If test is done without insurance authorization, patient may be responsible for the entire amount billed.      Precertification and Prior Authorizations:  If your physician has recommended that you have a procedure or additional testing performed the [facility]  Centralized Referral Team will contact your insurance carrier to obtain pre-certification or prior authorization.    You are strongly encouraged to contact your insurance carrier to verify that your procedure/test has been approved and is a COVERED benefit.  Although the [facility] Centralized Referral Team does its due diligence, the insurance carrier gives the disclaimer that \"Although the procedure is authorized, this does not guarantee payment.\"    Ultimately the patient is responsible for payment.   Thank you for your understanding in this matter.  Paperwork Completion:  If you require FMLA or disability paperwork for your recovery, please make sure to either drop it off or have it faxed to our office at [phone]. Be sure the form has your name and date of birth on it.  The form will be faxed to our Forms Department and they will complete it for you.  There is a 25$ fee for all forms that need to be filled out.  Please be aware there is a 10-14 day turnaround time.  You will need to sign a release of information ([place]OR[place]) form if your paperwork does not come with one.  You may call the Forms Department with any questions at [phone].  Their fax number is [phone].
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
## 2025-02-20 NOTE — PROGRESS NOTES
Patient: Phyllis Alexis  Medical Record Number: [id number]  Site: [facility]  Referring Physician:  Ana Cristina Hector  PCP: Dr. Lind    Dear Dr. Hector:    Thank you very much for requesting this consultation. I had the opportunity to evaluate and initiate care for your patient today, as per your request.    HISTORY OF CHIEF COMPLAINT:      Phyllis Alexis is a 54 year old female, who complains of R gluteal pain with posterior R LE pain to ankle.    Patient is here today with pain in above-described distribution that began atraumatically years ago.  Initially, it was isolated to lumbar spine, and with conservative management, she was doing \"well enough.\"  After a fall on ice in , had increase in pain, andover past year, developed LE complaints.  She has been evaluated by chiropractor without improvement.  Had been to physical therapy this had been largely ineffective.  Given course of tapered p.o. steroid without relief.  Using anti-inflammatories (meloxicam) to mild improvement.  Had been sent for an MRI which did reveal right S1 contact (see below).  Sent to neurosurgery and here for consideration of injections.    VAS Pain Score:  4-10/10    Aggravating Factors: Relieving Factors:   Sitting or driving for long distances  Changing positions      Past Treatment Attempted/Patient’s Response:  As above     Past Medical History:    Anxiety    Hyperlipidemia      Past Surgical History:   Procedure Laterality Date    Appendectomy      Appendectomy            Tubal ligation        Family History   Problem Relation Age of Onset    Other (Other) Father 81        Covid Complications    Diabetes Father     Diabetes Maternal Grandmother     Asthma Brother     Diabetes Brother     Psychiatric Son         Autism      Social History     Socioeconomic History    Marital status:    Tobacco Use    Smoking status: Former     Current packs/day: 0.00     Types: Cigarettes     Quit date: 2020      Years since quittin.2    Smokeless tobacco: Former    Tobacco comments:     I used to smoke one or two cigarettes a day but I used to quit for long periods of time   Vaping Use    Vaping status: Never Used   Substance and Sexual Activity    Alcohol use: Yes     Alcohol/week: 1.0 standard drink of alcohol     Types: 1 Glasses of wine per week     Comment: not once a week more like once a month    Drug use: Never    Sexual activity: Not Currently   Other Topics Concern    Caffeine Concern No    Exercise No    Seat Belt Yes     Comment: I use it all the time    Special Diet No    Stress Concern Yes     Comment: I have a son with autism and that causes a lot of stress    Weight Concern Yes     Comment: yes its difficult to lose weight more after menopause      Current Medications:  Current Outpatient Medications   Medication Sig Dispense Refill    MELOXICAM 15 MG Oral Tab TAKE 1 TABLET (15 MG TOTAL) BY MOUTH DAILY AS NEEDED FOR PAIN. DO NOT TAKE WITH OTHER NSAIDS SUCH AS ALEVE, MOTRIN, IBUPROFEN OR ADVIL 30 tablet 0    meclizine 25 MG Oral Tab Take 1 tablet (25 mg total) by mouth every 8 (eight) hours as needed for Dizziness. 30 tablet 0        Functional Assessment: Patient reports that they are able to complete all of their ADL's such as eating, bathing, using the toilet, dressing and getting up from a bed or a chair independently.    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    REVIEW OF SYSTEMS:   10 point review of systems is otherwise negative,unless otherwise in HPI.      Radiology/Lab Test Reviewed:  MRI L spine:    IMPRESSION:   1.L5-S1: Posterior disc bulge with a superimposed right subarticular protrusion measuring 8 mm AP dimension. Effacement of the right subarticular recess with encroachment on the right S1 nerve root. Encroachment on the thecal sac without significant central canal stenosis. Mild to moderate left and no right neural foraminal narrowing.    2.L4-5: Mild central canal stenosis. Mild to moderate bilateral neural foraminal narrowing.   3.L3-4: No central canal stenosis. Mild bilateral neural foraminal narrowing.         CBC:    Lab Results   Component Value Date    WBC 5.8 08/08/2022   No results found for: \"HEMOGLOBIN\"  Lab Results   Component Value Date     08/08/2022         PHYSICAL EXAMIMATION   PHYSICAL EXAMINATION: Phyllis Alexis is a 54 year old female who is observed sitting comfortably on a chair in the exam room alert and oriented times three. She looks consistent with her stated age.    Ht Readings from Last 1 Encounters:   02/18/25 62\"     Wt Readings from Last 1 Encounters:   02/20/25 173 lb (78.5 kg)     The patient is well developed, well nourished, well muscled, obese. She moves independently from sitting to standing with ease.       Inspection:  No acute distress    Patient displays Non-antalgic gait.    Coordination:  Well-coordinated, fluent gait, able to engage in rapid alternating movements of upper and lower extremities.    ROM Lumbar Spine:  See chart below:  Motion Right (+ or -) Left (+ or -)   Lumbar Flexion + -   Lumbar Extension - -   Lumbar Bending - -   Lumbar Extension/ twisting motion - -     Lumbar/Sacral Integument:  Skin over lumbar sacral spine is intact without rashes, excoriations, lesions or erythema noted    Palpation:  Negative tenderness to palpation at Bilateral lumbar facets/paraspinals/piriformis/BSIJ and bilateral trochanteric bursas  Palpation of lumbar area Right (+ or -) Left (+ or -)   Lumbar facets - -   Lumbar paraspinals - -   Piriformis - -   SIJ - -   Trochanteric Bursa - -     Deep Tendon Reflexes:  Grossly intact to bilateral lower extremities 2/4 throughout    Strength:  Strength of bilateral lower extremities grossly intact; 5/5 throughout    Sensation:  No sensory deficits noted on bilateral lower extremities to light touch    Tests:  Test Right (+ or -) Left (+ or -)   SLR + -    Kaiden’s     Babinski     Clonus       HEAD/NECK: Head is normocephalic, neck supple  EYES: EOMI, BLAYNE  SKIN EXAM: Skin is intact, head, neck, trunk and arms/legs. No rashes, mottling or ulcerations.  LYMPH EXAM: There is no lymph edema in either lower extremity.  VASCULAR EXAM: Pedal pulses are normal bilaterally, with good distal perfusion. No clubbing or cyanosis.  ABDOMINAL EXAM: Abdomen is soft without masses palpated.  HEART: normal, regular, S1 and S2  LUNGS:CTA  MUSCULOSKELETAL: Smooth, pain-free ROM to bilat hips, ankles, and knees.     Do you have any known blood/bleeding disorders?  No  Does patient currently take blood thinners?   None  Does patient currently take any antibiotics?   No      Patient is currently on pain medications:  No  Reason pain medications are prescribed: N/A  Pain medications are prescribed by: N/A  [place] Prescription Monitoring review: N/A  DIRE: N/A  Treatment decision: N/A    MEDICAL DECISION MAKING:     Impression: Right L5-S1 HNP, right S1 radiculopathy    Right gluteal and radicular lower extremity pain in an S1 dermatome in the setting of MRI evidence of a right L5-S1 HNP and S1 compression, in keeping with her complaints.  While she has had axial low back pain for years, this had been well-controlled with conservative management.  Over the last year, has developed current complaints and has failed time, activity modification, physical therapy with HEP, chiropractic care, steroids and NSAIDs.  Has been evaluated by neurosurgery, and prior to consideration of more aggressive options was sent for TF-MICHAEL.    On exam does have pain with range of motion lumbar spine.  Positive right straight leg raise.  No focal sensory/motor deficits.    Will proceed with a right S1 TF-MICHAEL, risks and benefits of which were reviewed in detail.  Follow-up 2 to 3 weeks thereafter.    Plan: Right S1 TF-MICHAEL, follow-up 2 to 3 weeks.    The patient indicates understanding of these issues and  agrees to the plan.      Thank you very much.     Respectfully yours,    MIMI Vital

## 2025-02-27 ENCOUNTER — TELEPHONE (OUTPATIENT)
Dept: PAIN CLINIC | Facility: CLINIC | Age: 55
End: 2025-02-27

## 2025-02-27 DIAGNOSIS — M54.16 LUMBAR RADICULITIS: Primary | ICD-10-CM

## 2025-02-27 NOTE — TELEPHONE ENCOUNTER
Patient needs to contact Billing Dept (phone #: [phone]) and set up payment plan for injection prior to scheduling injection.   CPT Code for TLESI -60335       TE routed to  staff.

## 2025-02-27 NOTE — TELEPHONE ENCOUNTER
Spoke with patient and told her to contact Billing Dept (phone #: [phone]) and set up payment plan for injection. Provided patient with CPT code. Patient had no further questions.

## 2025-02-27 NOTE — TELEPHONE ENCOUNTER
Patient calling to schedule Right S1 TF-MICHAEL  injection. Patient states she is going to be self-pay for injection.

## 2025-02-28 NOTE — TELEPHONE ENCOUNTER
Patient states she called the Billing Dept at [phone] and was told there are no orders in the system for the procedure.  Patient states billing department told her there needs to be an order in the system before she can prepay.

## 2025-02-28 NOTE — TELEPHONE ENCOUNTER
Contacted Hospital Billing Dept to confirm if this was the correct phone # for patient to call and set up payment for injection prior to injection being scheduled. As patient contacted this phone # and she was told that order is not in her chart.     -- explained to Billing Dept that order is in patient's chart.  provided fax # to their dept (fax #: [phone]) and Central Scheduling Phone #.      will check for correct phone # for payment to schedule payment plan.

## 2025-02-28 NOTE — TELEPHONE ENCOUNTER
Patient called stating that she spoke with the billing dept regarding setting up the payment plan. Patient stated that when she spoke with billing department, they told her that they can't bill her or accept a payment until they received a procedure date. Rcvd ok to transfer call to [place].

## 2025-02-28 NOTE — TELEPHONE ENCOUNTER
Call transferred from PSR - patient calling that she contacted billing dept and they informed her that they needed hospital bill first prior to setting up payment plan.     Informed patient that after confirming with our  she would need to contact Hospital Billing Dept at [phone] to schedule payment plan. Patient SIXTO.

## 2025-02-28 NOTE — TELEPHONE ENCOUNTER
Patient checking to see if there is an updated phone # to schedule payment plan. Informed patient that once there is an updated phone # to schedule the office will contact patient.

## 2025-03-03 NOTE — TELEPHONE ENCOUNTER
was able to confirm that per Cash Applications for Hospital, patient will set up payment plan after procedure has been completed and patient receives bill.     Contacted patient relayed information above and offered to schedule injection. Patient VU and agreed to schedule.     Patient advised of insurance approval to proceed with injections and is agreeable to scheduling. pre-procedure instructions reviewed. Patient prefers Local sedation. Reviewed sedation instructions including No Fasting & No  Required. Patient encouraged but not required to hold Meloxicam for 24 hours prior to procedure. Patient verbalized understanding of instructions, no further needs at this time.        [facility]  PRE-PROCEDURE INSTRUCTIONS WITHOUT SEDATION    Procedure: Right S1 Transforaminal Injection.     Appointment Date: 03/13/2025  Check-In Time: 12:00 PM    Follow-Up Date/Time: 03/27/2025 @ 09:00 AM    Prior to the procedure:  Please update us prior to the procedure if you are experiencing any symptoms of infection such as cough, fever, chills, urinary symptoms, or have recently been prescribed antibiotics, have open wounds, have recently had surgery or dental procedures.    Day of Procedure:  **Drivers will be required for patients who receive prescriptions for Valium.    NO FASTING REQUIRED  Please bring your Insurance Card, Photo ID, List of Current Medications and Referral (if applicable) to your appointment.  Please park in the [facility] and follow the signs to the [facility].  Check in at [facility] ([address] [place] [address]) outpatient registration in the [facility].  Please note-No prescriptions will be written by Pain Clinic in OR on the day of procedure. If you require a refill of medications, please contact the office 48 hours prior to your procedure.  If you have an implanted Spinal Cord or Peripheral Nerve Stimulator: Please remember to turn device  off for procedure.        Medication Hold:    Number of days you need to be off for the following medications:    Aggrenox 10 days   Agrylin (Anagrelide) 10 days  Brilinta (Ticagrelor) 7 days  Imbruvica (Ibrutinib) 3 days   Enbrel (Etanercept) 24 hours   Fragmin (Dalteparin) 24 hours   Pletal (Cilostazol) 7 days  Effient (Prasugrel) 7 days  Pradaxa 10 days  Trental 7 days  Eliquis (Apixaban) 3 days  Xarelto (Rivaroxaban) 3 days  Lovenox (Enoxaparin) 24 hours  Aspirin  Greater than 81mg but less than 325mg   5 days  325mg and greater                  7 days  Coumadin       5 days  Procedure may be cancelled if INR is elevated.   Excedrin (with aspirin) 7 days  Plavix (Clopidogrel)                            7 days    NSAIDs: 24 hours preferred      Ibuprofen (Motrin, Advil, Vicoprofen), Naproxen (Naprosyn, Aleve), Piroxcam (Feldene), Meloxicam (Mobic), Oxaprozin (Daypro), Diclofenac (Voltaren), Indomethacin (Indocin), Etodolac (Lodine), Nabumetone (Relafen), Celebrex (Celecoxib)           HERBAL SUPPLEMENTS  5 days preferred  Fish oil, krill oil, Omega-3, Vascepa, Vitamin E, Turmeric, Garlic                       Insurance Authorization:   Most insurances are now requiring a preauthorization for all procedures.  In the event that your insurance does not authorize your procedure within 48 hours of the scheduled date, your procedure will be cancelled and rescheduled to a later date.  Please contact your insurance carrier to determine what your financial responsibility will be for the procedure(s).      Cancellation/Rescheduling Appointment:   In the event you need to cancel or reschedule your appointment, you must notify the office 24 hours prior.    Post-procedure instructions:        Please schedule a follow up visit within 2 to 4 weeks after your last procedure date   Please call our office with any questions or concerns before or after your procedure at  [phone].  If you are a diabetic, please increase the  frequency of your glucose monitoring after the procedure as this may cause a temporary increase in your blood sugar.  Contact your primary care physician if your blood sugar rises as you may require some medication adjustment.  It is normal to have increased pain at injection site for up to 3-5 days after procedure, you can use heat or ice (20 minutes on 20 minutes off) for comfort.    **To hear a recorded version of these instructions, please call [phone] and follow the prompts.  **Para escuchar las instrucciones en Español, por favor de llamar el jayson [phone] opción 4.

## 2025-03-12 NOTE — PROGRESS NOTES
Unable to leave pre-procedure instructions, no verbal release in patients chart to leave messages.

## 2025-03-13 ENCOUNTER — HOSPITAL ENCOUNTER (OUTPATIENT)
Facility: HOSPITAL | Age: 55
Setting detail: HOSPITAL OUTPATIENT SURGERY
Discharge: HOME | End: 2025-03-13
Attending: ANESTHESIOLOGY | Admitting: ANESTHESIOLOGY

## 2025-03-13 ENCOUNTER — APPOINTMENT (OUTPATIENT)
Dept: GENERAL RADIOLOGY | Facility: HOSPITAL | Age: 55
End: 2025-03-13
Attending: ANESTHESIOLOGY

## 2025-03-13 VITALS
SYSTOLIC BLOOD PRESSURE: 139 MMHG | TEMPERATURE: 97 F | BODY MASS INDEX: 31.83 KG/M2 | HEART RATE: 71 BPM | OXYGEN SATURATION: 99 % | WEIGHT: 173 LBS | DIASTOLIC BLOOD PRESSURE: 84 MMHG | HEIGHT: 62 IN | RESPIRATION RATE: 20 BRPM

## 2025-03-13 PROBLEM — M54.18 SACRAL RADICULITIS: Status: ACTIVE | Noted: 2025-03-13

## 2025-03-13 LAB — B-HCG UR QL: NEGATIVE

## 2025-03-13 PROCEDURE — 3E0R33Z INTRODUCTION OF ANTI-INFLAMMATORY INTO SPINAL CANAL, PERCUTANEOUS APPROACH: ICD-10-PCS | Performed by: ANESTHESIOLOGY

## 2025-03-13 PROCEDURE — 64483 NJX AA&/STRD TFRM EPI L/S 1: CPT | Performed by: ANESTHESIOLOGY

## 2025-03-13 RX ORDER — SODIUM CHLORIDE 9 MG/ML
INJECTION, SOLUTION INTRAMUSCULAR; INTRAVENOUS; SUBCUTANEOUS
Status: DISCONTINUED | OUTPATIENT
Start: 2025-03-13 | End: 2025-03-13

## 2025-03-13 RX ORDER — DEXAMETHASONE SODIUM PHOSPHATE 10 MG/ML
INJECTION, SOLUTION INTRAMUSCULAR; INTRAVENOUS
Status: DISCONTINUED | OUTPATIENT
Start: 2025-03-13 | End: 2025-03-13

## 2025-03-13 RX ORDER — LIDOCAINE HYDROCHLORIDE 10 MG/ML
INJECTION, SOLUTION EPIDURAL; INFILTRATION; INTRACAUDAL; PERINEURAL
Status: DISCONTINUED | OUTPATIENT
Start: 2025-03-13 | End: 2025-03-13

## 2025-03-13 RX ORDER — IBUPROFEN 800 MG/1
800 TABLET, FILM COATED ORAL EVERY 6 HOURS PRN
COMMUNITY

## 2025-03-13 RX ORDER — NALOXONE HYDROCHLORIDE 0.4 MG/ML
0.08 INJECTION, SOLUTION INTRAMUSCULAR; INTRAVENOUS; SUBCUTANEOUS AS NEEDED
Status: DISCONTINUED | OUTPATIENT
Start: 2025-03-13 | End: 2025-03-13

## 2025-03-13 NOTE — H&P
History & Physical Examination    Patient Name: Phyllis Alexis  MRN: [id number]  CSN: [id number]  YOB: 1970    Pre-Operative Diagnosis:  Lumbar radiculitis [M54.16]    Present Illness: Lumbar radiculitis    ASA: 2  MP class: 1  Sedation: Local     Prescriptions Prior to Admission[1]  No current facility-administered medications for this encounter.       Allergies: Allergies[2]    Past Medical History:    Anxiety    Hyperlipidemia     Past Surgical History:   Procedure Laterality Date    Appendectomy      Appendectomy            Tubal ligation       Family History   Problem Relation Age of Onset    Other (Other) Father 81        Covid Complications    Diabetes Father     Diabetes Maternal Grandmother     Asthma Brother     Diabetes Brother     Psychiatric Son         Autism     Social History     Tobacco Use    Smoking status: Former     Current packs/day: 0.00     Types: Cigarettes     Quit date: 2020     Years since quittin.3    Smokeless tobacco: Former    Tobacco comments:     I used to smoke one or two cigarettes a day but I used to quit for long periods of time   Substance Use Topics    Alcohol use: Yes     Alcohol/week: 1.0 standard drink of alcohol     Types: 1 Glasses of wine per week     Comment: not once a week more like once a month       SYSTEM Check if Review is Normal Check if Physical Exam is Normal If not normal, please explain:   HEENT [x ] [x ]    NECK & BACK [x ] [x ]    HEART [x ] [x ]    LUNGS [x ] [x ]    ABDOMEN [x ] [x ]    UROGENITAL [x ] [x ]    EXTREMITIES [x ] [x ]    OTHER        [ x ] I have discussed the risks and benefits and alternatives with the patient/family.  They understand and agree to proceed with plan of care.  [ x ] I have reviewed the History and Physical done within the last 30 days.  Any changes noted above.    Tico Trujillo MD              [1]   Medications Prior to Admission   Medication Sig Dispense Refill Last Dose/Taking     ibuprofen 800 MG Oral Tab Take 1 tablet (800 mg total) by mouth every 6 (six) hours as needed for Pain.   3/12/2025 at 10:00 AM    MELOXICAM 15 MG Oral Tab TAKE 1 TABLET (15 MG TOTAL) BY MOUTH DAILY AS NEEDED FOR PAIN. DO NOT TAKE WITH OTHER NSAIDS SUCH AS ALEVE, MOTRIN, IBUPROFEN OR ADVIL 30 tablet 0 3/6/2025    meclizine 25 MG Oral Tab Take 1 tablet (25 mg total) by mouth every 8 (eight) hours as needed for Dizziness. 30 tablet 0 More than a month   [2]   Allergies  Allergen Reactions    Dust SHORTNESS OF BREATH, OTHER (SEE COMMENTS) and Coughing    Dust Mite Extract UNKNOWN     Fall allergies

## 2025-03-13 NOTE — DISCHARGE INSTRUCTIONS
Home Care Instructions Following Your Pain Procedure     Phyllis,  It has been a pleasure to have you as our patient. To help you at home, you must follow these general discharge instructions. We will review these with you before you are discharged. It is our hope that you have a complete and uneventful recovery from our procedure.     General Instructions:  What to Expect:  Bandages from your procedure today can be removed when you get home.  Please avoid soaking and/or swimming for 24 hours.  Showering is okay  It is normal to have increased pain symptoms and/or pain at injection site for up to 3-5 days after procedure, you can use heat or ice (20 minutes on 20 minutes off) for comfort.  You may experience some temporary side effects which may include restlessness or insomnia, flushing of the face, or heart palpitations.  Please contact the provider if these symptoms do not resolve within 3-4 days.  Lightheadedness or nausea may occur and should resolve within 24 to 48 hours.  If you develop a headache after treatment, rest, drink fluids (with caffeine, if possible) and take mild over-the-counter pain medication.  If the headache does not improve with the above treatment, contact the physician.  Home Medications:  Resume all previously prescribed medication.  Please avoid taking NSAIDs (Non-Steriodal Anti-Inflammatory Drugs) such as:  Ibuprofen ( Advil, Motrin) Aleve (Naproxen), Diclofenac, Meloxicam for 6 hours after procedure.   If you are on Coumadin (Warfarin) or any other anti-coagulant (or \"blood thinning\") medication such as Plavix (Clopidogrel), Xarelto (Rivaroxaban), Eliquis (Apixaban), Effient (Prasugrel) etc., restart on the following day from the procedure unless otherwise directed by your provider.  If you are a diabetic, please increase the frequency of your glucose monitoring after the procedure as steroids may cause a temporary (2-3 day) increase in your blood sugar.  Contact your primary care  physician if your blood sugar remains elevated as you may require some medication adjustment.  Diet:  Resume your regular diet as tolerated.  Activity:  We recommend that you relax and rest during the rest of your procedure day.  If you feel weakness in your arms or legs do not drive.  Follow-up Appointment  Please schedule a follow-up visit within 3 to 4 weeks after your last procedure date.  Question or Concerns:  Feel free to call our office with any questions or concerns at [phone] (option #2)    Phyllis  Thank you for coming to [facility] for your procedure.  The nurses try very hard to make sure you receive the best care possible.  Your trust in them as well as us is greatly appreciated.    Thanks so much,   Dr. Tico Trujillo

## 2025-03-13 NOTE — OPERATIVE REPORT
[facility]  Operative Report  3/13/2025     Phyllis Alexis Patient Status:  Hospital Outpatient Surgery    3/10/1970 MRN [id number]   Location [facility] Attending Tico Trujillo MD   Hosp Day # 0 PCP Reny Lind MD     Indication: Phyllis is a 55 year old female with lumbosacral radiculitis. MRI reviewed consistent with radiculopathy.    Preoperative Diagnosis:  Lumbar radiculitis [M54.16]    Postoperative Diagnosis: Same as above.    Procedure performed: RIGHT S1/S2 TRANSFORAMINAL LUMBAR EPIDURAL STEROID INJECTION SINGLE LEVEL WITH LOCAL    Anesthesia: Local      EBL: Less than 1 ml.    Procedure Description:   After reviewing the patient's history and performing a focused physical examination, the diagnosis was confirmed and contraindications such as infection and coagulopathy were ruled out.  Following review of potential side effects and complications, including but not necessarily limited to infection, allergic reaction, local tissue breakdown, nerve injury, and paresis, the patient indicated they understood and agreed to proceed. After obtaining the informed consent, the patient was brought to the procedure room and monitored.        In the prone position, following sterile prep and drape of the lumbar region, the S1 neural foramen was identified under fluoroscopy.  The skin and subcutaneous tissue was anesthetized via 25-gauge 1.5\" needle with approximately 2 cc of 1% lidocaine.  A 22-gauge 5\" Quincke spinal needle was introduced toward the inferior aspect of the junction between the transverse process and pedicle of the S1 level atraumatically under fluoroscopic guidance. The needle was advanced into the anterior epidural space at this level. The needle position was confirmed under AP and lateral fluoroscopic view.  Following negative aspiration for CSF and blood, approximately 1 cc of Omnipaque 240 was injected.  An excellent contrast spread along the  epidural space and the nerve root was obtained.  At this point, 2 cc of normal saline with 10 mg of dexamethasone was injected without complication.  The needle was withdrawn with stylet in situ. The patient tolerated procedure very well.  The patient was observed until discharge criteria met.  Discharge instructions were given and patient was released to a responsible adult.       Complications: None.    Follow up: The patient was followed in the pain clinic as needed basis.      Tico Trujillo MD

## 2025-03-14 ENCOUNTER — TELEPHONE (OUTPATIENT)
Dept: PAIN CLINIC | Facility: CLINIC | Age: 55
End: 2025-03-14

## 2025-03-14 NOTE — TELEPHONE ENCOUNTER
Austen called placed to patient for post procedure follow up. Patient stated no questions nor concerns but unable to sleep last night,pt understand that this is one of the side effects of the medication.  Pt verbalized understanding to call with any questions or concerns.      Procedure: TLESI  Date: 3/13/2025  Follow up Visit Scheduled: 3/27/2025 with Julio Torre

## 2025-03-21 ENCOUNTER — HOSPITAL ENCOUNTER (EMERGENCY)
Facility: HOSPITAL | Age: 55
Discharge: HOME | End: 2025-03-21
Attending: EMERGENCY MEDICINE

## 2025-03-21 ENCOUNTER — NURSE TRIAGE (OUTPATIENT)
Dept: INTERNAL MEDICINE CLINIC | Facility: CLINIC | Age: 55
End: 2025-03-21

## 2025-03-21 ENCOUNTER — APPOINTMENT (OUTPATIENT)
Dept: GENERAL RADIOLOGY | Facility: HOSPITAL | Age: 55
End: 2025-03-21
Attending: EMERGENCY MEDICINE

## 2025-03-21 VITALS
RESPIRATION RATE: 16 BRPM | SYSTOLIC BLOOD PRESSURE: 148 MMHG | OXYGEN SATURATION: 99 % | DIASTOLIC BLOOD PRESSURE: 82 MMHG | TEMPERATURE: 98 F | HEART RATE: 79 BPM

## 2025-03-21 DIAGNOSIS — R00.2 PALPITATIONS: Primary | ICD-10-CM

## 2025-03-21 LAB
ALBUMIN SERPL-MCNC: 4.9 G/DL (ref 3.2–4.8)
ALBUMIN/GLOB SERPL: 2 {RATIO} (ref 1–2)
ALP LIVER SERPL-CCNC: 66 U/L
ALT SERPL-CCNC: 24 U/L
ANION GAP SERPL CALC-SCNC: 17 MMOL/L (ref 0–18)
AST SERPL-CCNC: 17 U/L (ref ?–34)
ATRIAL RATE: 79 BPM
BASOPHILS # BLD AUTO: 0.03 X10(3) UL (ref 0–0.2)
BASOPHILS NFR BLD AUTO: 0.4 %
BILIRUB SERPL-MCNC: 0.5 MG/DL (ref 0.3–1.2)
BUN BLD-MCNC: 13 MG/DL (ref 9–23)
CALCIUM BLD-MCNC: 9.6 MG/DL (ref 8.7–10.6)
CHLORIDE SERPL-SCNC: 104 MMOL/L (ref 98–112)
CO2 SERPL-SCNC: 18 MMOL/L (ref 21–32)
CREAT BLD-MCNC: 0.79 MG/DL
D DIMER PPP FEU-MCNC: <0.27 UG/ML FEU (ref ?–0.54)
EGFRCR SERPLBLD CKD-EPI 2021: 89 ML/MIN/1.73M2 (ref 60–?)
EOSINOPHIL # BLD AUTO: 0.14 X10(3) UL (ref 0–0.7)
EOSINOPHIL NFR BLD AUTO: 2 %
ERYTHROCYTE [DISTWIDTH] IN BLOOD BY AUTOMATED COUNT: 12.5 %
GLOBULIN PLAS-MCNC: 2.5 G/DL (ref 2–3.5)
GLUCOSE BLD-MCNC: 96 MG/DL (ref 70–99)
HCT VFR BLD AUTO: 38.6 %
HGB BLD-MCNC: 13.6 G/DL
IMM GRANULOCYTES # BLD AUTO: 0.02 X10(3) UL (ref 0–1)
IMM GRANULOCYTES NFR BLD: 0.3 %
LYMPHOCYTES # BLD AUTO: 2.33 X10(3) UL (ref 1–4)
LYMPHOCYTES NFR BLD AUTO: 33 %
MCH RBC QN AUTO: 32.3 PG (ref 26–34)
MCHC RBC AUTO-ENTMCNC: 35.2 G/DL (ref 31–37)
MCV RBC AUTO: 91.7 FL
MONOCYTES # BLD AUTO: 0.58 X10(3) UL (ref 0.1–1)
MONOCYTES NFR BLD AUTO: 8.2 %
NEUTROPHILS # BLD AUTO: 3.95 X10 (3) UL (ref 1.5–7.7)
NEUTROPHILS # BLD AUTO: 3.95 X10(3) UL (ref 1.5–7.7)
NEUTROPHILS NFR BLD AUTO: 56.1 %
OSMOLALITY SERPL CALC.SUM OF ELEC: 288 MOSM/KG (ref 275–295)
P AXIS: 59 DEGREES
P-R INTERVAL: 174 MS
PLATELET # BLD AUTO: 230 10(3)UL (ref 150–450)
POTASSIUM SERPL-SCNC: 3.9 MMOL/L (ref 3.5–5.1)
PROT SERPL-MCNC: 7.4 G/DL (ref 5.7–8.2)
Q-T INTERVAL: 374 MS
QRS DURATION: 92 MS
QTC CALCULATION (BEZET): 428 MS
R AXIS: -42 DEGREES
RBC # BLD AUTO: 4.21 X10(6)UL
SODIUM SERPL-SCNC: 139 MMOL/L (ref 136–145)
T AXIS: 24 DEGREES
TROPONIN I SERPL HS-MCNC: <3 NG/L
TSI SER-ACNC: 0.94 UIU/ML (ref 0.55–4.78)
VENTRICULAR RATE: 79 BPM
WBC # BLD AUTO: 7.1 X10(3) UL (ref 4–11)

## 2025-03-21 PROCEDURE — 80053 COMPREHEN METABOLIC PANEL: CPT | Performed by: EMERGENCY MEDICINE

## 2025-03-21 PROCEDURE — 84484 ASSAY OF TROPONIN QUANT: CPT | Performed by: EMERGENCY MEDICINE

## 2025-03-21 PROCEDURE — 85025 COMPLETE CBC W/AUTO DIFF WBC: CPT | Performed by: EMERGENCY MEDICINE

## 2025-03-21 PROCEDURE — 93005 ELECTROCARDIOGRAM TRACING: CPT

## 2025-03-21 PROCEDURE — 36415 COLL VENOUS BLD VENIPUNCTURE: CPT

## 2025-03-21 PROCEDURE — 71046 X-RAY EXAM CHEST 2 VIEWS: CPT | Performed by: EMERGENCY MEDICINE

## 2025-03-21 PROCEDURE — 93010 ELECTROCARDIOGRAM REPORT: CPT

## 2025-03-21 PROCEDURE — 99284 EMERGENCY DEPT VISIT MOD MDM: CPT

## 2025-03-21 PROCEDURE — 99285 EMERGENCY DEPT VISIT HI MDM: CPT

## 2025-03-21 PROCEDURE — 84443 ASSAY THYROID STIM HORMONE: CPT | Performed by: EMERGENCY MEDICINE

## 2025-03-21 PROCEDURE — 85379 FIBRIN DEGRADATION QUANT: CPT | Performed by: EMERGENCY MEDICINE

## 2025-03-21 RX ORDER — ALPRAZOLAM 0.5 MG
0.5 TABLET ORAL 3 TIMES DAILY PRN
Qty: 20 TABLET | Refills: 0 | Status: SHIPPED | OUTPATIENT
Start: 2025-03-21 | End: 2025-03-28

## 2025-03-21 NOTE — CM/SW NOTE
Asked to assist in scheduling holter monitor. Spoke to patient at bedside at scheduled for Monday 3/24 at 1 :30PM.

## 2025-03-21 NOTE — ED PROVIDER NOTES
Patient Seen in: [facility] Emergency Department      History     Chief Complaint   Patient presents with    Arrythmia/Palpitations     Stated Complaint: palpitations and sob    Subjective:   HPI    55-year-old female presents for evaluation of palpitations.  Patient has had palpitations for the past 2 to 3 days.  She feels them mostly when she is laying flat at night.  Occasionally when she is walking around in her home.  Denies chest pain or shortness of breath.  No recent cough or cold.  No lower extremity pain or swelling.  No history of heart disease.  No recent medication changes      Objective:     Past Medical History:    Anxiety    Hyperlipidemia              Past Surgical History:   Procedure Laterality Date    Appendectomy      Appendectomy            Tubal ligation                  Social History     Socioeconomic History    Marital status:    Tobacco Use    Smoking status: Former     Current packs/day: 0.00     Types: Cigarettes     Quit date: 2020     Years since quittin.3     Passive exposure: Never    Smokeless tobacco: Former    Tobacco comments:     I used to smoke one or two cigarettes a day but I used to quit for long periods of time   Vaping Use    Vaping status: Never Used   Substance and Sexual Activity    Alcohol use: Yes     Alcohol/week: 1.0 standard drink of alcohol     Types: 1 Glasses of wine per week     Comment: once a month    Drug use: Never    Sexual activity: Not Currently   Other Topics Concern    Caffeine Concern No    Exercise No    Seat Belt Yes     Comment: I use it all the time    Special Diet No    Stress Concern Yes     Comment: I have a son with autism and that causes a lot of stress    Weight Concern Yes     Comment: yes its difficult to lose weight more after menopause                  Physical Exam     ED Triage Vitals [25 1639]   BP (!) 169/91   Pulse 82   Resp 16   Temp 98.3 °F (36.8 °C)   Temp src Temporal   SpO2 98 %   O2  Device None (Room air)       Current Vitals:   Vital Signs  BP: (!) 169/91  Pulse: 82  Resp: 16  Temp: 98.3 °F (36.8 °C)  Temp src: Temporal    Oxygen Therapy  SpO2: 98 %  O2 Device: None (Room air)        Physical Exam     General: Alert, oriented, no apparent distress  HEENT: Atraumatic, normocephalic.  Pupils equal reactive.  Extraocular motions intact.   Neck: Supple  Lungs: Clear to auscultation bilaterally.  Heart: Regular rate and rhythm.  Abdomen: Soft, nontender.   Skin: No rash.  No edema.  Neurologic: No focal neurologic deficits.    Musculoskeletal: No tenderness or deformity noted.     ED Course     Labs Reviewed   COMP METABOLIC PANEL (14) - Abnormal; Notable for the following components:       Result Value    CO2 18.0 (*)     Albumin 4.9 (*)     All other components within normal limits   TROPONIN I HIGH SENSITIVITY - Normal   D-DIMER - Normal   CBC WITH DIFFERENTIAL WITH PLATELET   TSH W REFLEX TO FREE T4     EKG    Rate, intervals and axes as noted on EKG Report.  Rate: 79  Rhythm: Sinus Rhythm  Reading: no ST elevation or depression                       MDM      Differential diagnosis includes dehydration, anemia, arrhythmia    I have independently visualized the radiology images, my focus/limited interpretation: No pneumonia    Defer to radiologist for other/incidental findings    Chemistry, CBC, troponin and D-dimer unremarkable.    No arrhythmias seen while monitor on telemetry.    I will send the patient with a 48-hour Holter monitor and follow-up with her PCP.  I will also give her a small dose of Xanax to try at night if she is having same symptoms    Medical Decision Making      Disposition and Plan     Clinical Impression:  1. Palpitations         Disposition:  Discharge  3/21/2025  6:30 pm    Follow-up:  Reny Velasquez  [place] ROMERO [address] [address]  [phone]    Call in 3 day(s)            Medications Prescribed:  Current Discharge Medication List         START taking these medications    Details   ALPRAZolam 0.5 MG Oral Tab Take 1 tablet (0.5 mg total) by mouth 3 (three) times daily as needed for Anxiety.  Qty: 20 tablet, Refills: 0    Associated Diagnoses: Palpitations                 Supplementary Documentation:

## 2025-03-21 NOTE — TELEPHONE ENCOUNTER
Action Requested: Summary for Provider     []  Critical Lab, Recommendations Needed  [x] Need Additional Advice  []   FYI    []   Need Orders  [] Need Medications Sent to Pharmacy  []  Other     SUMMARY: Spoke to patient who is experiencing palpations, slight mild chest pain, fever (feels warm), fatigue, and shortness of breath when waking up in the morning for 1 week. Patient states the shortness of breath is now happening throughout the day. Patient states she had a lumbar epidural steroid injection on 3/13 and ever since, the patient began having symptoms. Patient states she is unsure if its in relation to her blood pressure or blood sugar. Patient is not able to take blood pressure or blood sugar. Patient denies cold/ flu like symptoms, headache, blurry vision, abdominal pain, nausea, vomiting, and weakness. Patient did reach out to Dr. Trujillo's office and was advised to reach out to PCP regarding the palpations. Patient is wanting to be seen in office.      This RN recommended patient to be seen in an immediate care or emergency room given her symptoms. Patient declined and wants to be seen in office. This RN emphasized to patient that providers have no openings. Patient will like to know PCP recommendations and see if other providers can see her next week.       Dr. Heath- Any further recommendations. Please advise. TY    Reason for call: Sick Call (Palpations, slight mild chest pain, fever, cold feet)  Onset: Mar 14, 2025                   Reason for Disposition   Chest pain(s) lasting a few seconds persists > 3 days   Patient wants to be seen    Protocols used: Chest Pain-A-[place]

## 2025-03-21 NOTE — TELEPHONE ENCOUNTER
Agree that she needs to go to urgent care.   Could also go to a free clinic given lack of insurance/cost worries.   She needs to be physically evaluated and likely stat imaging and labs.

## 2025-03-21 NOTE — ED INITIAL ASSESSMENT (HPI)
Patient arrives with complaint of SOB x2-3 days. Patient states it's worse at night when she lays down and it makes her feel anxious so she feels like she has to get up and do something. Patient states has not been sleeping well x2-3 days. Patient states \"it's a sensation like pressure but not hard on the chest.\" Patient denies jaw pain, arm pain, or changes to chronic back pain. Patient states she feels tired and weak.

## 2025-03-22 DIAGNOSIS — M54.30 BACK PAIN WITH SCIATICA: ICD-10-CM

## 2025-03-22 DIAGNOSIS — M54.9 BACK PAIN WITH SCIATICA: ICD-10-CM

## 2025-03-24 ENCOUNTER — HOSPITAL ENCOUNTER (OUTPATIENT)
Dept: CV DIAGNOSTICS | Facility: HOSPITAL | Age: 55
Discharge: HOME | End: 2025-03-24
Attending: EMERGENCY MEDICINE

## 2025-03-24 DIAGNOSIS — R00.2 PALPITATIONS: ICD-10-CM

## 2025-03-24 PROCEDURE — 93227 XTRNL ECG REC<48 HR R&I: CPT | Performed by: EMERGENCY MEDICINE

## 2025-03-24 PROCEDURE — 93226 XTRNL ECG REC<48 HR SCAN A/R: CPT | Performed by: EMERGENCY MEDICINE

## 2025-03-24 PROCEDURE — 93225 XTRNL ECG REC<48 HRS REC: CPT | Performed by: EMERGENCY MEDICINE

## 2025-03-24 NOTE — TELEPHONE ENCOUNTER
Requesting    Name from pharmacy: MELOXICAM 15 MG TABLET         Will file in chart as: MELOXICAM 15 MG Oral Tab    Sig: TAKE 1 TABLET (15 MG TOTAL) BY MOUTH DAILY AS NEEDED FOR PAIN. DO NOT TAKE WITH OTHER NSAIDS SUCH AS ALEVE, MOTRIN, IBUPROFEN OR ADVIL    Disp: 30 tablet    Refills: 0 (Pharmacy requested: Not specified)    Start: 3/22/2025    Class: Normal    Non-formulary For: Back pain with sciatica    Last ordered: 1 month ago (2/11/2025) by Reny Lind MD    Last refill: 2/11/2025    Rx #: [id number]    Non-Narcotic Pain Medication Protocol Passed 03/22/2025 12:20 AM   Protocol Details In person appointment or virtual visit in the past 6 mos or appointment in next 3 mos    Medication is active on med list      To be filled at: [facility] [address] [address] [phone], [phone]     LOV: 01/15/25 w/ RHODA  RTC: No Follow Up on File   Last Relevant Labs: 03/21/25  Future Appointments   Date Time Provider Department Center   3/27/2025  9:00 AM Julio Torre PA [facility]   5/20/2025  9:00 AM Eulalio Young MD [facility]

## 2025-03-25 RX ORDER — MELOXICAM 15 MG/1
15 TABLET ORAL DAILY PRN
Qty: 30 TABLET | Refills: 0 | Status: SHIPPED | OUTPATIENT
Start: 2025-03-25

## 2025-03-27 ENCOUNTER — OFFICE VISIT (OUTPATIENT)
Dept: PAIN CLINIC | Facility: CLINIC | Age: 55
End: 2025-03-27

## 2025-03-27 VITALS
BODY MASS INDEX: 32 KG/M2 | HEART RATE: 86 BPM | WEIGHT: 173 LBS | OXYGEN SATURATION: 98 % | SYSTOLIC BLOOD PRESSURE: 102 MMHG | DIASTOLIC BLOOD PRESSURE: 68 MMHG

## 2025-03-27 DIAGNOSIS — M51.26 PROTRUSION OF LUMBAR INTERVERTEBRAL DISC: Primary | ICD-10-CM

## 2025-03-27 DIAGNOSIS — M54.16 RIGHT LUMBAR RADICULITIS: ICD-10-CM

## 2025-03-27 PROCEDURE — 99214 OFFICE O/P EST MOD 30 MIN: CPT | Performed by: PHYSICIAN ASSISTANT

## 2025-03-27 NOTE — PROGRESS NOTES
HPI:   Phyllis Alexis presents with complaints of right gluteal and posterior LE pain to ankle.    The pain is described as moderate aching, shooting that is intermittent.  The patient’s activity level has remained the same since last visit.  The pain is worst unrelated to time of day.    Changes in condition/history since last visit: Patient is here today for follow-up after initial right S1 transforaminal epidural steroid injection on 3/13/2025.  Procedure was well-tolerated and had no adverse effects.  Overall, reports >90% initial relief, and for 2 days was very pleased with her response.  With time, this has waned, though is still ~40% improved over baseline.  Wishes to repeat procedure.      Last procedure: Right S1 TF-MICHAEL    date: 3/13/2025    Percentage of relief experienced from the procedure: %    Duration of the relief: 30-40% sustained     The following activities will increase the patient’s pain: walking, standing    The following activities decrease the patient’s pain: limiting activity level    Functional Assessment: Patient reports that they are able to complete all of their ADL's such as eating, bathing, using the toilet, dressing and getting up from a bed or a chair independently.    Current Medications:  Current Outpatient Medications   Medication Sig Dispense Refill    MELOXICAM 15 MG Oral Tab TAKE 1 TABLET (15 MG TOTAL) BY MOUTH DAILY AS NEEDED FOR PAIN. DO NOT TAKE WITH OTHER NSAIDS SUCH AS ALEVE, MOTRIN, IBUPROFEN OR ADVIL 30 tablet 0    ALPRAZolam 0.5 MG Oral Tab Take 1 tablet (0.5 mg total) by mouth 3 (three) times daily as needed for Anxiety. 20 tablet 0    ibuprofen 800 MG Oral Tab Take 1 tablet (800 mg total) by mouth every 6 (six) hours as needed for Pain.      meclizine 25 MG Oral Tab Take 1 tablet (25 mg total) by mouth every 8 (eight) hours as needed for Dizziness. 30 tablet 0      Patient requires assistance with: No assistance required    Reviewed Patient History  Dated: 3/13/25 no changes noted    Physical Exam:   There were no vitals taken for this visit.  VAS Pain Score:  3/10  General Appearance: Well developed, well nourished, normal build, independent body habitus, no apparent physical disabilities, well groomed    Neurological Exam: WNL-Orientation to time, place and person, normal mood & effect, normal concentration & attention span  Inspection: non-antalgic,   Radiology/Lab Test Reviewed: MRI L spine:     IMPRESSION:   1.L5-S1: Posterior disc bulge with a superimposed right subarticular protrusion measuring 8 mm AP dimension. Effacement of the right subarticular recess with encroachment on the right S1 nerve root. Encroachment on the thecal sac without significant central canal stenosis. Mild to moderate left and no right neural foraminal narrowing.   2.L4-5: Mild central canal stenosis. Mild to moderate bilateral neural foraminal narrowing.   3.L3-4: No central canal stenosis. Mild bilateral neural foraminal narrowing.          Lab Results   Component Value Date    WBC 7.1 03/21/2025    WBC 5.8 08/08/2022   No results found for: \"HEMOGLOBIN\"  Lab Results   Component Value Date    .0 03/21/2025     08/08/2022     Do you have any known blood/bleeding disorders?  No  Does patient currently take blood thinners?   None  Does patient currently take any antibiotics?   No  Patient educated and verbalized understanding.  Medical Decision Making:   Diagnosis:    Encounter Diagnoses   Name Primary?    Protrusion of lumbar intervertebral disc Yes    Right lumbar radiculitis      Impression: Excellent diagnostic relief, reporting 90 to 100% improvement following right S1 transforaminal epidural steroid injection on 3/13/2025.  Unfortunately, most of this has waned, though does continue with 30 to 40% sustained relief.  At this time, wishes a repeat procedure though feels as though it is not needed just yet.  She will call us when she is ready to schedule.    Plan:  Patient to schedule the following injection: Right S1 TF-MICHAEL #2 Levels: S1-2 foramen, Procedure and risks were discussed with pt. including headache, bleeding, infection and potential nerve damage.  Follow-up 2 to 3 weeks.  No orders of the defined types were placed in this encounter.      Meds & Refills for this Visit:  Requested Prescriptions      No prescriptions requested or ordered in this encounter       Imaging & Consults:  None    The patient indicates understanding of these issues and agrees to the plan.    MIMI Vital

## 2025-03-27 NOTE — PROGRESS NOTES
Last procedure: RIGHT S1/S2 TRANSFORAMINAL LUMBAR EPIDURAL STEROID INJECTION SINGLE LEVEL WITH LOCAL   Date: 03/13/25  Percentage of relief obtained: less than 50%  Duration of relief: current     Current Pain Score: 3

## 2025-03-27 NOTE — PATIENT INSTRUCTIONS
Refill policies:    Allow 2-3 business days for refills; controlled substances may take longer.  Contact your pharmacy at least 5 days prior to running out of medication and have them send an electronic request or submit request through the “request refill” option in your [employer] account.  Refills are not addressed on weekends; covering physicians do not authorize routine medications on weekends.  No narcotics or controlled substances are refilled after noon on Fridays or by on call physicians.  By law, narcotics must be electronically prescribed.  A 30 day supply with no refills is the maximum allowed.  If your prescription is due for a refill, you may be due for a follow up appointment.  To best provide you care, patients receiving routine medications need to be seen at least once a year.  Patients receiving narcotic/controlled substance medications need to be seen at least once every 3 months.  In the event that your preferred pharmacy does not have the requested medication in stock (e.g. Backordered), it is your responsibility to find another pharmacy that has the requested medication available.  We will gladly send a new prescription to that pharmacy at your request.    Scheduling Tests:    If your physician has ordered radiology tests such as MRI or CT scans, please contact Central Scheduling at [phone] right away to schedule the test.  Once scheduled, the [facility] Centralized Referral Team will work with your insurance carrier to obtain pre-certification or prior authorization.  Depending on your insurance carrier, approval may take 3-10 days.  It is highly recommended patients assure they have received an authorization before having a test performed.  If test is done without insurance authorization, patient may be responsible for the entire amount billed.      Precertification and Prior Authorizations:  If your physician has recommended that you have a procedure or additional testing performed the [facility]  Centralized Referral Team will contact your insurance carrier to obtain pre-certification or prior authorization.    You are strongly encouraged to contact your insurance carrier to verify that your procedure/test has been approved and is a COVERED benefit.  Although the [facility] Centralized Referral Team does its due diligence, the insurance carrier gives the disclaimer that \"Although the procedure is authorized, this does not guarantee payment.\"    Ultimately the patient is responsible for payment.   Thank you for your understanding in this matter.  Paperwork Completion:  If you require FMLA or disability paperwork for your recovery, please make sure to either drop it off or have it faxed to our office at [phone]. Be sure the form has your name and date of birth on it.  The form will be faxed to our Forms Department and they will complete it for you.  There is a 25$ fee for all forms that need to be filled out.  Please be aware there is a 10-14 day turnaround time.  You will need to sign a release of information ([place]OR[place]) form if your paperwork does not come with one.  You may call the Forms Department with any questions at [phone].  Their fax number is [phone].

## 2025-05-20 ENCOUNTER — OFFICE VISIT (OUTPATIENT)
Dept: SURGERY | Facility: CLINIC | Age: 55
End: 2025-05-20

## 2025-05-20 VITALS
BODY MASS INDEX: 31.83 KG/M2 | DIASTOLIC BLOOD PRESSURE: 82 MMHG | WEIGHT: 173 LBS | HEIGHT: 62 IN | SYSTOLIC BLOOD PRESSURE: 122 MMHG

## 2025-05-20 DIAGNOSIS — M51.26 LUMBAR DISC HERNIATION: ICD-10-CM

## 2025-05-20 DIAGNOSIS — M54.16 RIGHT LUMBAR RADICULOPATHY: Primary | ICD-10-CM

## 2025-05-20 PROCEDURE — 99212 OFFICE O/P EST SF 10 MIN: CPT | Performed by: NEUROLOGICAL SURGERY

## 2025-05-20 NOTE — PATIENT INSTRUCTIONS
Refill policies:    Allow 2-3 business days for refills; controlled substances may take longer.  Contact your pharmacy at least 5 days prior to running out of medication and have them send an electronic request or submit request through the “request refill” option in your [employer]) account.  Refills are not addressed on weekends; covering physicians do not authorize routine medications on weekends.  No narcotics or controlled substances are refilled after noon on Fridays or by on call physicians.  By law, narcotics must be electronically prescribed.  A 30 day supply with no refills is the maximum allowed.  If your prescription is due for a refill, you may be due for a follow up appointment.  To best provide you care, patients receiving routine medications need to be seen at least once a year.  Patients receiving narcotic/controlled substance medications need to be seen at least once every 3 months.  In the event that your preferred pharmacy does not have the requested medication in stock (e.g. Backordered), it is your responsibility to find another pharmacy that has the requested medication available.  We will gladly send a new prescription to that pharmacy at your request.    Scheduling Tests:    If your physician has ordered radiology tests such as MRI or CT scans, please contact Central Scheduling at [phone] right away to schedule the test.  Once scheduled, the [facility] Centralized Referral Team will work with your insurance carrier to obtain pre-certification or prior authorization.  Depending on your insurance carrier, approval may take 3-10 days.  It is highly recommended patients assure they have received an authorization before having a test performed.  If test is done without insurance authorization, patient may be responsible for the entire amount billed.      Precertification and Prior Authorizations:  If your physician has recommended that you have a procedure or additional testing performed the [facility]  Centralized Referral Team will contact your insurance carrier to obtain pre-certification or prior authorization.    You are strongly encouraged to contact your insurance carrier to verify that your procedure/test has been approved and is a COVERED benefit.  Although the [facility] Centralized Referral Team does its due diligence, the insurance carrier gives the disclaimer that \"Although the procedure is authorized, this does not guarantee payment.\"    Ultimately the patient is responsible for payment.   Thank you for your understanding in this matter.  Paperwork Completion:  If you require FMLA or disability paperwork for your recovery, please make sure to either drop it off or have it faxed to our office at [phone]. Be sure the form has your name and date of birth on it.  The form will be faxed to our Forms Department and they will complete it for you.  There is a 25$ fee for all forms that need to be filled out.  Please be aware there is a 10-14 day turnaround time.  You will need to sign a release of information ([place]OR[place]) form if your paperwork does not come with one.  You may call the Forms Department with any questions at [phone].  Their fax number is [phone].

## 2025-05-20 NOTE — PROGRESS NOTES
Neurosurgery Clinic Visit  2025    Phyllis Alexis PCP:  Reny Lind MD    3/10/1970 MRN [id number]     HISTORY OF PRESENT ILLNESS:  Phyllis Alexis is a(n) 55 year old female presents for follow-up  She is doing well  She got her injection  She is 95% better        PHYSICAL EXAMINATION:  Vital Signs:  /82   Ht 62\"   Wt 173 lb (78.5 kg)   BMI 31.64 kg/m²   Awake alert, orient x 3  Follows commands x 4      REVIEW OF STUDIES:    MRI reviewed which shows disc bulge to the right at L5-S1      ASSESSMENT and PLAN:  55-year-old female with right-sided radiculopathy  She is doing well  She is doing great with injection  She will call me for any further follow-up  Discussed with follow-up with pain management if she wants to get a second injection  All questions were answered  Patient appreciative        Time spent on counseling/coordination of care:  10 minutes    Total time spent with patient:  10 minutes      Eulalio Young MD   [facility]  2025  9:23 AM    Dictated but not proofread

## 2025-05-20 NOTE — PROGRESS NOTES
Established patient:  Reason for follow up:   F/u after injection--right lumbar radiculopathy     Numeric Rating Scale:       Pain at Present: 0/10       Most recent treatments for Current Pain Condition:     03/12/25 right S1 TFESI-90% relief

## (undated) DEVICE — GLOVE SUR 7.5 SENSICARE PIP WHT PWD F

## (undated) DEVICE — REMOVER LOT 4OZ N IRRIG UNSCNT SFT MOIST LIQ

## (undated) DEVICE — PAIN TRAY: Brand: MEDLINE INDUSTRIES, INC.

## (undated) DEVICE — NEEDLE SPNL 22GA L5IN BLK HUB QNCKE BVL DISP

## (undated) DEVICE — BANDAGE ADH 1INX3IN NAT FAB N ADH PD CURAD

## (undated) DEVICE — SKIN REG/FINE DUAL MARKER, RULER, LABELS: Brand: MEDLINE

## (undated) DEVICE — GLOVE,SURG,SENSICARE,ALOE,LF,PF,7: Brand: MEDLINE